# Patient Record
Sex: FEMALE | Race: BLACK OR AFRICAN AMERICAN | NOT HISPANIC OR LATINO | Employment: OTHER | URBAN - METROPOLITAN AREA
[De-identification: names, ages, dates, MRNs, and addresses within clinical notes are randomized per-mention and may not be internally consistent; named-entity substitution may affect disease eponyms.]

---

## 2019-05-27 ENCOUNTER — OFFICE VISIT (OUTPATIENT)
Dept: URGENT CARE | Facility: CLINIC | Age: 74
End: 2019-05-27
Payer: MEDICARE

## 2019-05-27 VITALS
SYSTOLIC BLOOD PRESSURE: 154 MMHG | DIASTOLIC BLOOD PRESSURE: 76 MMHG | RESPIRATION RATE: 20 BRPM | OXYGEN SATURATION: 95 % | TEMPERATURE: 98 F | HEIGHT: 64 IN | HEART RATE: 84 BPM | WEIGHT: 212 LBS | BODY MASS INDEX: 36.19 KG/M2

## 2019-05-27 DIAGNOSIS — S92.901A CLOSED FRACTURE OF RIGHT FOOT, INITIAL ENCOUNTER: Primary | ICD-10-CM

## 2019-05-27 PROCEDURE — 73610 XR ANKLE COMPLETE 3 VIEW RIGHT: ICD-10-PCS | Mod: RT,S$GLB,, | Performed by: RADIOLOGY

## 2019-05-27 PROCEDURE — 73630 XR FOOT COMPLETE 3 VIEW RIGHT: ICD-10-PCS | Mod: RT,S$GLB,, | Performed by: RADIOLOGY

## 2019-05-27 PROCEDURE — 99214 OFFICE O/P EST MOD 30 MIN: CPT | Mod: S$GLB,,, | Performed by: FAMILY MEDICINE

## 2019-05-27 PROCEDURE — 73610 X-RAY EXAM OF ANKLE: CPT | Mod: RT,S$GLB,, | Performed by: RADIOLOGY

## 2019-05-27 PROCEDURE — 3078F DIAST BP <80 MM HG: CPT | Mod: CPTII,S$GLB,, | Performed by: FAMILY MEDICINE

## 2019-05-27 PROCEDURE — 99214 PR OFFICE/OUTPT VISIT, EST, LEVL IV, 30-39 MIN: ICD-10-PCS | Mod: S$GLB,,, | Performed by: FAMILY MEDICINE

## 2019-05-27 PROCEDURE — 3077F SYST BP >= 140 MM HG: CPT | Mod: CPTII,S$GLB,, | Performed by: FAMILY MEDICINE

## 2019-05-27 PROCEDURE — 3077F PR MOST RECENT SYSTOLIC BLOOD PRESSURE >= 140 MM HG: ICD-10-PCS | Mod: CPTII,S$GLB,, | Performed by: FAMILY MEDICINE

## 2019-05-27 PROCEDURE — 3078F PR MOST RECENT DIASTOLIC BLOOD PRESSURE < 80 MM HG: ICD-10-PCS | Mod: CPTII,S$GLB,, | Performed by: FAMILY MEDICINE

## 2019-05-27 PROCEDURE — 73630 X-RAY EXAM OF FOOT: CPT | Mod: RT,S$GLB,, | Performed by: RADIOLOGY

## 2019-05-27 RX ORDER — OMEPRAZOLE 20 MG/1
20 CAPSULE, DELAYED RELEASE ORAL
COMMUNITY
Start: 2019-05-06

## 2019-05-27 RX ORDER — KETOROLAC TROMETHAMINE 5 MG/ML
SOLUTION OPHTHALMIC
Refills: 0 | COMMUNITY
Start: 2019-03-13 | End: 2020-11-03

## 2019-05-27 RX ORDER — OFLOXACIN 3 MG/ML
SOLUTION/ DROPS OPHTHALMIC
Refills: 0 | COMMUNITY
Start: 2019-03-13 | End: 2020-11-03

## 2019-05-27 NOTE — PATIENT INSTRUCTIONS
Foot Fracture  You have a broken bone (fracture) in your foot. This will cause pain, swelling, and often bruising. It will usually take about 4 to 8 weeks to heal. A foot fracture may be treated with a special shoe, splint, cast, or boot.  Home care  Follow these guidelines when caring for yourself at home:  · You may be given a splint, cast, shoe, or boot to keep the injured area from moving. Unless you were told otherwise, use crutches or a walker. Dont put weight on the injured foot until your health care provider says you can do so. (You can rent crutches and a walker at many pharmacies and surgical or orthopedic supply stores.) Dont put weight on a splint, or it will break.  · Keep your leg elevated to reduce pain and swelling. When sleeping, put a pillow under the injured leg. When sitting, support the injured leg so it is above your waist. This is very important during the first 2 days (48 hours).  · Put an ice pack on the injured area. Do this for 20 minutes every 1 to 2 hours the first day for pain relief. You can make an ice pack by wrapping a plastic bag of ice cubes in a thin towel. As the ice melts, be careful that the splint, cast, boot, or shoe doesnt get wet. You can place the ice pack directly over the splint or cast. Unless told otherwise, you can open the boot or shoe to apply the ice pack. Continue using the ice pack 3 to 4 times a day for the next 2 days. Then use the ice pack as needed to ease pain and swelling.  · Keep the splint, cast, boot, or shoe dry. When bathing, protect it with a large plastic bag, rubber-banded at the top end. If a fiberglass splint or cast or boot gets wet, you can dry it with a hair dryer. Unless told otherwise, you can take off the boot or shoe to bathe.  · You may use acetaminophen or ibuprofen to control pain, unless another pain medicine was prescribed. If you have chronic liver or kidney disease, talk with your healthcare provider before using these  medicines. Also talk with your provider if youve had a stomach ulcer or gastrointestinal bleeding.  · Dont put creams or objects under the cast if you have itching.  Follow-up care  Follow up with your healthcare provider, or as advised. This is to make sure the bone is healing the way it should. If you were given a splint, it may be changed to a cast or boot at your follow-up visit.  X-rays may be taken. You will be told of any new findings that may affect your care.  When to seek medical advice  Call your healthcare provider right away if any of these occur:  · The cast or splint cracks  · The plaster cast or splint becomes wet or soft  · The fiberglass cast or splint stays wet for more than 24 hours  · Bad odor from the cast or wound fluid stains the cast  · Tightness or pain under the cast or splint gets worse  · Toes become swollen, cold, blue, numb, or tingly  · You cant move your toes  · Skin around cast or splint becomes red  · Fever of 100.4ºF (38ºC) or higher, or as directed by your healthcare provider  Date Last Reviewed: 2/1/2017  © 5404-7674 [employer]. [address] [place] [address], [place] [address]. All rights reserved. This information is not intended as a substitute for professional medical care. Always follow your healthcare professional's instructions.

## 2019-05-27 NOTE — PROGRESS NOTES
"Subjective:       Patient ID: Beatrice Shankar is a 74 y.o. female.    Vitals:  height is 5' 4" (1.626 m) and weight is 96.2 kg (212 lb). Her tympanic temperature is 98.3 °F (36.8 °C). Her blood pressure is 154/76 (abnormal) and her pulse is 84. Her respiration is 20 and oxygen saturation is 95%.     Chief Complaint: Foot Injury (Right Foot/Ankle)    This is a 74 y.o. female who presents today with a chief complaint of right foot/ankle injury which occurred yesterday. Patient states [place] was [place] when she twisted her ankle.  Her right foot/ankle is swollen and the foot is bruised on top.  She has taken Tylenol with some relief.     Foot Injury    The incident occurred 12 to 24 hours ago. The incident occurred in the street. The injury mechanism was a twisting injury. The pain is present in the right ankle, right foot and right heel. The pain is at a severity of 6/10. The pain is moderate. The pain has been constant since onset. She reports no foreign bodies present. The symptoms are aggravated by weight bearing and movement. Treatments tried: Tylenol. The treatment provided mild relief.       Constitution: Negative for fatigue.   HENT: Negative for facial swelling and facial trauma.    Neck: Negative for neck stiffness.   Cardiovascular: Negative for chest trauma.   Eyes: Negative for eye trauma, double vision and blurred vision.   Gastrointestinal: Negative for abdominal trauma, abdominal pain and rectal bleeding.   Genitourinary: Negative for hematuria, missed menses, genital trauma and pelvic pain.   Musculoskeletal: Positive for pain and trauma. Negative for joint swelling and abnormal ROM of joint.   Skin: Positive for bruising. Negative for color change, wound, abrasion and laceration.   Neurological: Negative for dizziness, history of vertigo, light-headedness, coordination disturbances, altered mental status and loss of consciousness.   Hematologic/Lymphatic: Negative for history " of bleeding disorder.   Psychiatric/Behavioral: Negative for altered mental status.       Objective:      Physical Exam   Constitutional: She appears well-developed and well-nourished.   HENT:   Head: Normocephalic and atraumatic.   Eyes: Pupils are equal, round, and reactive to light. EOM are normal.   Cardiovascular: Normal rate and regular rhythm.   Pulmonary/Chest: Effort normal and breath sounds normal.   Musculoskeletal: She exhibits edema (dorsum of right foot - diffuse swelling noted) and tenderness.   Nursing note and vitals reviewed.      Assessment:       1. Closed fracture of right foot, initial encounter        Plan:         Closed fracture of right foot, initial encounter  -     X-Ray Foot Complete 3 view Right; Future; Expected date: 05/27/2019  -     X-Ray Ankle Complete 3 View Right; Future; Expected date: 05/27/2019  -     NON-PNEUMATIC WALKING BOOT FOR HOME USE  -     CRUTCHES FOR HOME USE  -     Ambulatory referral to Orthopedics

## 2019-08-19 DIAGNOSIS — S92.341A DISPLACED FRACTURE OF FOURTH METATARSAL BONE, RIGHT FOOT, INITIAL ENCOUNTER FOR CLOSED FRACTURE: ICD-10-CM

## 2019-08-19 DIAGNOSIS — S92.331A DISPLACED FRACTURE OF THIRD METATARSAL BONE, RIGHT FOOT, INITIAL ENCOUNTER FOR CLOSED FRACTURE: Primary | ICD-10-CM

## 2019-08-19 DIAGNOSIS — S92.351A DISPLACED FRACTURE OF FIFTH METATARSAL BONE, RIGHT FOOT, INITIAL ENCOUNTER FOR CLOSED FRACTURE: ICD-10-CM

## 2019-08-19 DIAGNOSIS — M25.571 PAIN IN RIGHT ANKLE AND JOINTS OF RIGHT FOOT: ICD-10-CM

## 2019-10-09 ENCOUNTER — CLINICAL SUPPORT (OUTPATIENT)
Dept: REHABILITATION | Facility: HOSPITAL | Age: 74
End: 2019-10-09
Payer: MEDICARE

## 2019-10-09 DIAGNOSIS — R26.89 POOR BALANCE: ICD-10-CM

## 2019-10-09 DIAGNOSIS — R29.898 WEAKNESS OF BOTH LOWER EXTREMITIES: ICD-10-CM

## 2019-10-09 PROCEDURE — G8979 MOBILITY GOAL STATUS: HCPCS | Mod: CJ

## 2019-10-09 PROCEDURE — G8978 MOBILITY CURRENT STATUS: HCPCS | Mod: CJ

## 2019-10-09 PROCEDURE — 97161 PT EVAL LOW COMPLEX 20 MIN: CPT

## 2019-10-09 NOTE — PLAN OF CARE
[facility]  Physical Therapy Initial Evaluation    Name: Beatirce Vernon [facility] Number: [id number]    Therapy Diagnosis:   Encounter Diagnoses   Name Primary?    Poor balance     Weakness of both lower extremities      Physician: Antonio Jensen*    Physician Orders: PT Eval and Treat   Medical Diagnosis from Referral:   S92.331A (ICD-10-CM) - Displaced fracture of third metatarsal bone, right foot, initial encounter for closed fracture  S92.341A (ICD-10-CM) - Displaced fracture of fourth metatarsal bone, right foot, initial encounter for closed fracture  S92.351A (ICD-10-CM) - Displaced fracture of fifth metatarsal bone, right foot, initial encounter for closed fracture  M25.571 (ICD-10-CM) - Pain in right ankle and joints of right foot  Evaluation Date: 10/9/2019  Authorization Period Expiration: 10/19/2019  Plan of Care Expiration: 11/22/2019  Visit # / Visits authorized: 1/ 12    Time In: 915  Time Out: 955  Total Billable Time: 5 minutes    Visit: 113   Total: 113    Precautions: Standard and Fall    Subjective   Date of onset: May 26  History of current condition - Beatrice reports: in May of 2019, pt just received cataract surgery. Pt reports that her vision was not good that day and she turned her head while walking and twisted her ankle. Pt reports that she fractured three of her metatarsal bones in her R foot. Pt reports that she went to the [facility] urgent care the following day to receive xrays. Pt reports she went to [facility] [place] over the last couple of months for follow up visits. Pt reports that her doctor told her that her swelling is getting better and her fractures are healing well. Pt reports that she wears her walking boot whenever she leaves her house, but she tried to wear her tennis shoes when she went to the eye doctor yesterday. Pt reports that she did fine walking in her tennis shoe outside her house. Pt reports that she does not wear her boot  inside her house and she does well getting around. Pt reports she uses her crutch to get in and out of her shower just to be extra cautious she does not fall. Pt reports that she has 3 flights of stairs to get into her apartment and does well navigating them. Pt reports that she would only follow up with her doctor if she needs to.      Medical History:   Past Medical History:   Diagnosis Date    Diabetes mellitus, type 2     Hypertension        Surgical History:   Beatrice Shankar  has a past surgical history that includes Cataract extraction; Appendectomy;  section; Tubal ligation; and Breast biopsy.    Medications:   Beatrice Vernon has a current medication list which includes the following prescription(s): aspirin, atorvastatin, blood sugar diagnostic, esomeprazole, glyburide, irbesartan, ketorolac 0.5%, ofloxacin, omeprazole, paroxetine, and pentoxifylline.    Allergies:   Review of patient's allergies indicates:   Allergen Reactions    Mycinette [cetylpyridinium-benzocaine]     Penicillins     Sulfur         Imaging, Xrays: See imaging report    Prior Therapy: Yes for her back, Pt reports her therapy helped her back  Social History: 3 flights of stairs lives with their family  Occupation: Retired  Prior Level of Function: Independent   Current Level of Function: Independent     Pain:  Current 0/10, worst 0/10, best 0/10   - Pt only complains of soreness, not pain    Location: right ankles and feet   Description: Aching  Aggravating Factors: Walking for prolonged periods  Easing Factors: rest    Pts goals: Get out of her boot and be able to walk 3x a week    Objective     Hip Right Right Left Left Pain/Dysfunction with Movement    AROM MMT AROM MMT    Flexion WNL 4/5 WNL 4/5       Knee Right Right Left Left Pain/Dysfunction with Movement    AROM MMT AROM MMT    Flexion WNL 4/5 WNL 4/5    Extension WNL 5/5 WNL 5/5      Ankle Right Right Left Left Pain/Dysfunction with Movement    AROM MMT AROM MMT     Plantarflexion WNL 4+/5 WNL 4+/5    Dorsiflexion WNL 4+/5 WNL 4+/5    Inversion WNL 4+/5 WNL 4+/5    Eversion WNL 4+/5 WNL 4+/5      Single Leg Balance Test  R 8 seconds  L 30 seconds        CMS Impairment/Limitation/Restriction for FOTO Foot Survey    Therapist reviewed FOTO scores for Beatrice Shankar on 10/9/2019.   FOTO documents entered into [employer] - see Media section.    Limitation Score: 39%  Category: Mobility    Current : CJ = at least 20% but < 40% impaired, limited or restricted  Goal: CJ = at least 20% but < 40% impaired, limited or restricted           TREATMENT   Treatment Time In: 950  Treatment Time Out: 955  Total Treatment time separate from Evaluation: 5 minutes    Beatrice received therapeutic exercises to develop strength for 5 minutes including:    Calf Raises DL 3x10      Home Exercises and Patient Education Provided    Education provided:   - HEP    Written Home Exercises Provided: yes.  Exercises were reviewed and Beatrice was able to demonstrate them prior to the end of the session.  Beatrice demonstrated good  understanding of the education provided.     See EMR under Patient Instructions for exercises provided 10/9/2019.    Assessment   Beatrice Vernon is a 74 y.o. female referred to outpatient Physical Therapy with a medical diagnosis of fracture of third, fourth, and fifth metatarsal heads on R foot. Pt presents with decreased balance and proprioception on R LE. Pt demo normal LE strength and range of motion. Pt was educated on weaning from boot as tolerated. Pt would benefit from physical therapy to improve proprioception and balance of R LE. Pts condition is not limiting pt from ADLs or functional activities.     Pt prognosis is Excellent.   Pt will benefit from skilled outpatient Physical Therapy to address the deficits stated above and in the chart below, provide pt/family education, and to maximize pt's level of independence.     Plan of care discussed with patient: Yes  Pt's spiritual,  cultural and educational needs considered and patient is agreeable to the plan of care and goals as stated below:     Anticipated Barriers for therapy: None    Medical Necessity is demonstrated by the following  History  Co-morbidities and personal factors that may impact the plan of care Co-morbidities:   HTN    Personal Factors:   no deficits     moderate   Examination  Body Structures and Functions, activity limitations and participation restrictions that may impact the plan of care Body Regions:   Foot    Body Systems:    Proprioception     Participation Restrictions:   None    Activity limitations:   Learning and applying knowledge  no deficits    General Tasks and Commands  no deficits    Communication  no deficits    Mobility  no deficits    Self care  no deficits    Domestic Life  no deficits    Interactions/Relationships  no deficits    Life Areas  no deficits    Community and Social Life  no deficits         low   Clinical Presentation stable and uncomplicated low   Decision Making/ Complexity Score: low     Goals:    Long Term Goals (6 Weeks):   1. Pt will improve FOTO score to </= 20% limited to decrease perceived limitation with maintaining/changing body position.   2. Pt will perform SLS on R LE for 30 seconds without loss of balance  3. Pt will score a normal score on BHAT Balance testing  4. Pt will be independent with HEP for improvement of functional mobility.       Plan   Plan of care Certification: 10/9/2019 to 11/22/2019.    Outpatient Physical Therapy 1 times weekly for 6 weeks to include the following interventions: Manual Therapy, Moist Heat/ Ice, Neuromuscular Re-ed, Patient Education, Self Care, Therapeutic Activites and Therapeutic Exercise.     Mukund Harmon, PT

## 2019-10-16 ENCOUNTER — CLINICAL SUPPORT (OUTPATIENT)
Dept: REHABILITATION | Facility: HOSPITAL | Age: 74
End: 2019-10-16
Payer: MEDICARE

## 2019-10-16 DIAGNOSIS — R26.89 POOR BALANCE: ICD-10-CM

## 2019-10-16 DIAGNOSIS — R29.898 WEAKNESS OF BOTH LOWER EXTREMITIES: ICD-10-CM

## 2019-10-16 PROCEDURE — 97110 THERAPEUTIC EXERCISES: CPT

## 2019-10-16 PROCEDURE — 97112 NEUROMUSCULAR REEDUCATION: CPT

## 2019-10-16 NOTE — PROGRESS NOTES
Physical Therapy Daily Treatment Note     Name: Beatrice Vernon [facility] Number: [id number]    Therapy Diagnosis:   Encounter Diagnoses   Name Primary?    Poor balance     Weakness of both lower extremities      Physician: Antonio Jensen*    Visit Date: 10/16/2019    Physician Orders: PT Eval and Treat   Medical Diagnosis from Referral:   S92.331A (ICD-10-CM) - Displaced fracture of third metatarsal bone, right foot, initial encounter for closed fracture  S92.341A (ICD-10-CM) - Displaced fracture of fourth metatarsal bone, right foot, initial encounter for closed fracture  S92.351A (ICD-10-CM) - Displaced fracture of fifth metatarsal bone, right foot, initial encounter for closed fracture  M25.571 (ICD-10-CM) - Pain in right ankle and joints of right foot  Evaluation Date: 10/9/2019  Authorization Period Expiration: 10/19/2019  Plan of Care Expiration: 11/22/2019  Visit # / Visits authorized: 2/ 12     Time In: 205  Time Out: 300  Total Billable Time:  55 minutes     Visit: 121  Total: 234     Precautions: Standard and Fall       Subjective     Pt reports: doing great outside of her boot when walking. Pt reports she does not have pain when walking without her boot, but she occasionally feels sore. Pt reports that she is doing great walking up and down the stairs. Pt reports that the numbness and tingling in her toes is improving with the exercises she received.   She was compliant with home exercise program.  Response to previous treatment: No adverse effects  Functional change: None    Pain: 0/10  Location: right feet      Objective     Beatrice received therapeutic exercises to develop strength, endurance, ROM, flexibility, posture and core stabilization for 45 minutes including:    Bike 5 min    Gastroc Wedge Stretch 30 sec x 3  Soleus Wedge Stretch 30 sec x 3  Bridges 3x10  SLR 3x10  Clams 3x10  Toe scrunches 2 min x 2      Beatrice participated in neuromuscular re-education activities to improve:  Balance, Coordination and Proprioception for 10 minutes. The following activities were included:    Romberg Eyes Closed on Blue Foam 30 sec x 3  SLS with stool assist 30 sec x 3        Home Exercises Provided and Patient Education Provided     Education provided:   - HEP    Written Home Exercises Provided: yes.  Exercises were reviewed and Beatrice was able to demonstrate them prior to the end of the session.  Beatrice demonstrated good  understanding of the education provided.     See EMR under Patient Instructions for exercises provided 10/16/2019.    Assessment     Pt tolerated all LE strengthening exercises, stretching, and balance exercises without increased symptoms. Pt with 4-6 loss of balance with single leg stance on R leg secondary to decreased proprioception. Pt reported no adverse effects after leaving therapy session.   Beatrice is progressing well towards her goals.   Pt prognosis is Good.     Pt will continue to benefit from skilled outpatient physical therapy to address the deficits listed in the problem list box on initial evaluation, provide pt/family education and to maximize pt's level of independence in the home and community environment.     Pt's spiritual, cultural and educational needs considered and pt agreeable to plan of care and goals.     Anticipated barriers to physical therapy: None    Goals:   Long Term Goals (6 Weeks):   1. Pt will improve FOTO score to </= 20% limited to decrease perceived limitation with maintaining/changing body position.   2. Pt will perform SLS on R LE for 30 seconds without loss of balance  3. Pt will score a normal score on BHAT Balance testing  4. Pt will be independent with HEP for improvement of functional mobility.     Plan        Outpatient Physical Therapy 1 times weekly for 6 weeks to include the following interventions: Manual Therapy, Moist Heat/ Ice, Neuromuscular Re-ed, Patient Education, Self Care, Therapeutic Activites and Therapeutic Exercise.         Mukund Harmon, PT

## 2019-10-16 NOTE — PROGRESS NOTES
"  Physical Therapy Daily Treatment Note     Name: Beatrice Vernon [facility]  Clinic Number: [id number]    Therapy Diagnosis: No diagnosis found.  Physician: Antonio Jensen*    Visit Date: 10/16/2019    Physician Orders: PT Eval and Treat   Medical Diagnosis from Referral:   S92.331A (ICD-10-CM) - Displaced fracture of third metatarsal bone, right foot, initial encounter for closed fracture  S92.341A (ICD-10-CM) - Displaced fracture of fourth metatarsal bone, right foot, initial encounter for closed fracture  S92.351A (ICD-10-CM) - Displaced fracture of fifth metatarsal bone, right foot, initial encounter for closed fracture  M25.571 (ICD-10-CM) - Pain in right ankle and joints of right foot  Evaluation Date: 10/9/2019  Authorization Period Expiration: 10/19/2019  Plan of Care Expiration: 11/22/2019  Visit # / Visits authorized: 2/ 12     Time In: ***  Time Out: ***  Total Billable Time: *** minutes     Visit: ***  Total: 113     Precautions: Standard and Fall    Subjective     Pt reports: ***.  She {Actions; was/was not:02946} compliant with home exercise program.  Response to previous treatment: ***  Functional change: ***    Pain: {0-10:36238::"0"}/10  Location: {RIGHT/LEFT/BILATERAL:29030} {LOCATION ON BODY:77730}     Objective     Beatrice received therapeutic exercises to develop strength for *** minutes including:     Calf Raises DL 3x10        Home Exercises and Patient Education Provided     Education provided:   - HEP     Written Home Exercises Provided: yes.  Exercises were reviewed and Beatrice was able to demonstrate them prior to the end of the session.  Beatrice demonstrated good  understanding of the education provided.      See EMR under Patient Instructions for exercises provided 10/9/2019.    Assessment     ***  Beatrice {IS/IS NOT:47886} progressing well towards her goals.   Pt prognosis is {REHAB PROGNOSIS OHS:77606}.     Pt will continue to benefit from skilled outpatient physical therapy to address the " deficits listed in the problem list box on initial evaluation, provide pt/family education and to maximize pt's level of independence in the home and community environment.     Pt's spiritual, cultural and educational needs considered and pt agreeable to plan of care and goals.     Anticipated barriers to physical therapy: ***    Goals:     Long Term Goals (6 Weeks):   1. Pt will improve FOTO score to </= 20% limited to decrease perceived limitation with maintaining/changing body position.   2. Pt will perform SLS on R LE for 30 seconds without loss of balance  3. Pt will score a normal score on BHAT Balance testing  4. Pt will be independent with HEP for improvement of functional mobility.     Plan     ***    Nelda Hamilton, PT

## 2019-11-01 ENCOUNTER — CLINICAL SUPPORT (OUTPATIENT)
Dept: REHABILITATION | Facility: HOSPITAL | Age: 74
End: 2019-11-01
Payer: MEDICARE

## 2019-11-01 DIAGNOSIS — R29.898 WEAKNESS OF BOTH LOWER EXTREMITIES: ICD-10-CM

## 2019-11-01 DIAGNOSIS — R26.89 POOR BALANCE: ICD-10-CM

## 2019-11-01 PROCEDURE — 97110 THERAPEUTIC EXERCISES: CPT

## 2019-11-01 NOTE — PROGRESS NOTES
Physical Therapy Daily Treatment Note     Name: Beatrice Vernon [facility]  Clinic Number: [id number]    Therapy Diagnosis:   Encounter Diagnoses   Name Primary?    Poor balance     Weakness of both lower extremities      Physician: Antonio Jensen*    Visit Date: 11/1/2019    Physician Orders: PT Eval and Treat   Medical Diagnosis from Referral:   S92.331A (ICD-10-CM) - Displaced fracture of third metatarsal bone, right foot, initial encounter for closed fracture  S92.341A (ICD-10-CM) - Displaced fracture of fourth metatarsal bone, right foot, initial encounter for closed fracture  S92.351A (ICD-10-CM) - Displaced fracture of fifth metatarsal bone, right foot, initial encounter for closed fracture  M25.571 (ICD-10-CM) - Pain in right ankle and joints of right foot  Evaluation Date: 10/9/2019  Authorization Period Expiration: 10/19/2019  Plan of Care Expiration: 11/22/2019  Visit # / Visits authorized: 3/ 12     Time In: 11:00  Time Out: 11:50  Total Billable Time:  25 minutes     Visit: 60.64  Total: 295     Precautions: Standard and Fall       Subjective     Pt reports: she is no longer wearing the boot and is not having much trouble or pain with walking.   She was compliant with home exercise program.  Response to previous treatment: No adverse effects  Functional change: None    Pain: 0/10  Location: right feet      Objective     Beatrice received therapeutic exercises to develop strength, endurance, ROM, flexibility, posture and core stabilization for 40 minutes including:    Bike 5 min    Gastroc Wedge Stretch 30 sec x 3  Soleus Wedge Stretch 30 sec x 3  Bridges 3x10  SLR 3x10  Clams 3x10  Toe scrunches 2 min x 2  Ankle 4-way OTB 2x10      Beatrice participated in neuromuscular re-education activities to improve: Balance, Coordination and Proprioception for 10 minutes. The following activities were included:    Romberg Eyes Closed on Blue Foam 30 sec x 3  SLS with stool assist 30 sec x 3        Home Exercises  Provided and Patient Education Provided     Education provided:   - HEP    Written Home Exercises Provided: yes.  Exercises were reviewed and Beatrice was able to demonstrate them prior to the end of the session.  Beatrice demonstrated good  understanding of the education provided.     See EMR under Patient Instructions for exercises provided 10/16/2019.    Assessment     Pt tolerated all LE strengthening exercises, stretching, and balance exercises without increased symptoms. She was able to tolerated new therex with minimal cueing for correct form. Pt required CGA/SBA for balance exercises today.  Pt reported no adverse effects after leaving therapy session.   Beatrice is progressing well towards her goals.   Pt prognosis is Good.     Pt will continue to benefit from skilled outpatient physical therapy to address the deficits listed in the problem list box on initial evaluation, provide pt/family education and to maximize pt's level of independence in the home and community environment.     Pt's spiritual, cultural and educational needs considered and pt agreeable to plan of care and goals.     Anticipated barriers to physical therapy: None    Goals:   Long Term Goals (6 Weeks):   1. Pt will improve FOTO score to </= 20% limited to decrease perceived limitation with maintaining/changing body position.   2. Pt will perform SLS on R LE for 30 seconds without loss of balance  3. Pt will score a normal score on BHAT Balance testing  4. Pt will be independent with HEP for improvement of functional mobility.     Plan        Outpatient Physical Therapy 1 times weekly for 6 weeks to include the following interventions: Manual Therapy, Moist Heat/ Ice, Neuromuscular Re-ed, Patient Education, Self Care, Therapeutic Activites and Therapeutic Exercise.        Nelda Hamilton, PT

## 2019-11-11 ENCOUNTER — CLINICAL SUPPORT (OUTPATIENT)
Dept: REHABILITATION | Facility: HOSPITAL | Age: 74
End: 2019-11-11
Payer: MEDICARE

## 2019-11-11 DIAGNOSIS — R26.89 POOR BALANCE: ICD-10-CM

## 2019-11-11 DIAGNOSIS — R29.898 WEAKNESS OF BOTH LOWER EXTREMITIES: ICD-10-CM

## 2019-11-11 PROCEDURE — 97110 THERAPEUTIC EXERCISES: CPT

## 2019-11-11 NOTE — PROGRESS NOTES
Physical Therapy Daily Treatment Note     Name: Beatrice Vernon [facility]  Clinic Number: [id number]    Therapy Diagnosis:   Encounter Diagnoses   Name Primary?    Poor balance     Weakness of both lower extremities      Physician: Antonio Jensen*    Visit Date: 11/11/2019    Physician Orders: PT Eval and Treat   Medical Diagnosis from Referral:   S92.331A (ICD-10-CM) - Displaced fracture of third metatarsal bone, right foot, initial encounter for closed fracture  S92.341A (ICD-10-CM) - Displaced fracture of fourth metatarsal bone, right foot, initial encounter for closed fracture  S92.351A (ICD-10-CM) - Displaced fracture of fifth metatarsal bone, right foot, initial encounter for closed fracture  M25.571 (ICD-10-CM) - Pain in right ankle and joints of right foot  Evaluation Date: 10/9/2019  Authorization Period Expiration: 10/19/2019  Plan of Care Expiration: 11/22/2019  Visit # / Visits authorized: 4/ 12     Time In: 1:14 pm  Time Out: 2:00 pm  Total Billable Time:  23 minutes     Visit: 60.64  Total: 355     Precautions: Standard and Fall       Subjective     Pt reports: her foot is a little sore today; she was on it a lot this weekend   She was compliant with home exercise program.  Response to previous treatment: No adverse effects  Functional change: None    Pain: 0/10  Location: right feet      Objective     Beatrice received therapeutic exercises to develop strength, endurance, ROM, flexibility, posture and core stabilization for 36 minutes including:    Bike 5 min    Gastroc Wedge Stretch 30 sec x 3  Soleus Wedge Stretch 30 sec x 3  Bridges 3x10  SLR 3x10  Clams 3x10  Toe scrunches 2 min x 2  Toe yoga 20x each  Ankle 4-way OTB 2x10      Beatrice participated in neuromuscular re-education activities to improve: Balance, Coordination and Proprioception for 10 minutes. The following activities were included:    Romberg Eyes Closed on Blue Foam 30 sec x 3  SLS with 1 finger 30 sec x 3        Home Exercises  Provided and Patient Education Provided     Education provided:   - HEP    Written Home Exercises Provided: yes.  Exercises were reviewed and Beatrice was able to demonstrate them prior to the end of the session.  Beatrice demonstrated good  understanding of the education provided.     See EMR under Patient Instructions for exercises provided 10/16/2019.    Assessment     Pt tolerated all LE strengthening exercises, stretching, and balance exercises without increased symptoms. She was able to tolerated new toe yoga exercise with minimal cueing for correct form. Pt required SBA for balance exercises today.  Pt reported no adverse effects after leaving therapy session.   Beatrice is progressing well towards her goals.   Pt prognosis is Good.     Pt will continue to benefit from skilled outpatient physical therapy to address the deficits listed in the problem list box on initial evaluation, provide pt/family education and to maximize pt's level of independence in the home and community environment.     Pt's spiritual, cultural and educational needs considered and pt agreeable to plan of care and goals.     Anticipated barriers to physical therapy: None    Goals:   Long Term Goals (6 Weeks):   1. Pt will improve FOTO score to </= 20% limited to decrease perceived limitation with maintaining/changing body position.   2. Pt will perform SLS on R LE for 30 seconds without loss of balance  3. Pt will score a normal score on BHAT Balance testing  4. Pt will be independent with HEP for improvement of functional mobility.     Plan        Outpatient Physical Therapy 1 times weekly for 6 weeks to include the following interventions: Manual Therapy, Moist Heat/ Ice, Neuromuscular Re-ed, Patient Education, Self Care, Therapeutic Activites and Therapeutic Exercise.        Nelda Hamilton, PT

## 2019-11-18 ENCOUNTER — CLINICAL SUPPORT (OUTPATIENT)
Dept: REHABILITATION | Facility: HOSPITAL | Age: 74
End: 2019-11-18
Payer: MEDICARE

## 2019-11-18 DIAGNOSIS — R26.89 POOR BALANCE: ICD-10-CM

## 2019-11-18 DIAGNOSIS — R29.898 WEAKNESS OF BOTH LOWER EXTREMITIES: ICD-10-CM

## 2019-11-18 PROCEDURE — 97112 NEUROMUSCULAR REEDUCATION: CPT

## 2019-11-18 PROCEDURE — 97110 THERAPEUTIC EXERCISES: CPT

## 2019-11-18 NOTE — PROGRESS NOTES
Physical Therapy Daily Treatment Note     Name: Beatrice Vernon [facility]  Clinic Number: [id number]    Therapy Diagnosis:   Encounter Diagnoses   Name Primary?    Poor balance     Weakness of both lower extremities      Physician: Antnoio Jensen*    Visit Date: 11/18/2019    Physician Orders: PT Eval and Treat   Medical Diagnosis from Referral:   S92.331A (ICD-10-CM) - Displaced fracture of third metatarsal bone, right foot, initial encounter for closed fracture  S92.341A (ICD-10-CM) - Displaced fracture of fourth metatarsal bone, right foot, initial encounter for closed fracture  S92.351A (ICD-10-CM) - Displaced fracture of fifth metatarsal bone, right foot, initial encounter for closed fracture  M25.571 (ICD-10-CM) - Pain in right ankle and joints of right foot  Evaluation Date: 10/9/2019  Authorization Period Expiration: 10/19/2019  Plan of Care Expiration: 11/22/2019  Visit # / Visits authorized: 5/ 12     Time In: 2:00 pm  Time Out: 2:45 pm  Total Billable Time: 45 minutes     Visit: 95.11  Total: 450.11     Precautions: Standard and Fall       Subjective     Pt reports: her foot is a little sore today; she was on it a lot this weekend   She was compliant with home exercise program.  Response to previous treatment: No adverse effects  Functional change: None    Pain: 0/10  Location: right feet      Objective     Beatrice received therapeutic exercises to develop strength, endurance, ROM, flexibility, posture and core stabilization for 35 minutes including:    Bike 5 min    Gastroc Wedge Stretch 30 sec x 3  Soleus Wedge Stretch 30 sec x 3  Bridges 3x10- held today; increased LBP  SLR 3x10  Clams 3x10  Toe scrunches 2 min x 2  Toe yoga 20x each  Ankle 4-way OTB 2x10      Beatrice participated in neuromuscular re-education activities to improve: Balance, Coordination and Proprioception for 10 minutes. The following activities were included:    Romberg Eyes Closed on Blue Foam 30 sec x 3  SLS with 1 finger 30 sec  x 3  Cone taps 2x10        Home Exercises Provided and Patient Education Provided     Education provided:   - HEP    Written Home Exercises Provided: yes.  Exercises were reviewed and Beatrice was able to demonstrate them prior to the end of the session.  Beatrice demonstrated good  understanding of the education provided.     See EMR under Patient Instructions for exercises provided 10/16/2019.    Assessment     Pt tolerated all LE strengthening exercises, stretching, and balance exercises without increased symptoms. Pt required SBA for balance exercises today and tolerated new balance exercise well.  Pt reported no adverse effects after leaving therapy session.   Beatrice is progressing well towards her goals.   Pt prognosis is Good.     Pt will continue to benefit from skilled outpatient physical therapy to address the deficits listed in the problem list box on initial evaluation, provide pt/family education and to maximize pt's level of independence in the home and community environment.     Pt's spiritual, cultural and educational needs considered and pt agreeable to plan of care and goals.     Anticipated barriers to physical therapy: None    Goals:   Long Term Goals (6 Weeks):   1. Pt will improve FOTO score to </= 20% limited to decrease perceived limitation with maintaining/changing body position.   2. Pt will perform SLS on R LE for 30 seconds without loss of balance  3. Pt will score a normal score on BHAT Balance testing  4. Pt will be independent with HEP for improvement of functional mobility.     Plan        Outpatient Physical Therapy 1 times weekly for 6 weeks to include the following interventions: Manual Therapy, Moist Heat/ Ice, Neuromuscular Re-ed, Patient Education, Self Care, Therapeutic Activites and Therapeutic Exercise.        Nelda Hamilton, PT

## 2019-12-03 ENCOUNTER — DOCUMENTATION ONLY (OUTPATIENT)
Dept: REHABILITATION | Facility: HOSPITAL | Age: 74
End: 2019-12-03

## 2019-12-03 PROBLEM — R29.898 LEG WEAKNESS: Status: RESOLVED | Noted: 2019-10-09 | Resolved: 2019-12-03

## 2019-12-03 PROBLEM — R26.89 POOR BALANCE: Status: RESOLVED | Noted: 2019-10-09 | Resolved: 2019-12-03

## 2019-12-03 NOTE — PROGRESS NOTES
Outpatient Therapy Discharge Summary     Name: Beatrice Vernon [facility] Number: [id number]    Therapy Diagnosis:        Encounter Diagnoses   Name Primary?    Poor balance      Weakness of both lower extremities      Physician: Antonio Jensen*  Physician Orders: PT Eval and Treat   Medical Diagnosis from Referral:   S92.331A (ICD-10-CM) - Displaced fracture of third metatarsal bone, right foot, initial encounter for closed fracture  S92.341A (ICD-10-CM) - Displaced fracture of fourth metatarsal bone, right foot, initial encounter for closed fracture  S92.351A (ICD-10-CM) - Displaced fracture of fifth metatarsal bone, right foot, initial encounter for closed fracture  M25.571 (ICD-10-CM) - Pain in right ankle and joints of right foot  Evaluation Date: 10/9/2019      Date of Last visit: 11/18/19      Assessment        Discharge reason: Patient requested discharge    Plan   This patient is discharged from Physical Therapy

## 2020-01-15 ENCOUNTER — OFFICE VISIT (OUTPATIENT)
Dept: URGENT CARE | Facility: CLINIC | Age: 75
End: 2020-01-15
Payer: MEDICARE

## 2020-01-15 VITALS
RESPIRATION RATE: 20 BRPM | BODY MASS INDEX: 35.85 KG/M2 | TEMPERATURE: 98 F | SYSTOLIC BLOOD PRESSURE: 158 MMHG | HEIGHT: 64 IN | WEIGHT: 210 LBS | DIASTOLIC BLOOD PRESSURE: 80 MMHG | OXYGEN SATURATION: 97 % | HEART RATE: 85 BPM

## 2020-01-15 DIAGNOSIS — J01.00 ACUTE MAXILLARY SINUSITIS, RECURRENCE NOT SPECIFIED: Primary | ICD-10-CM

## 2020-01-15 PROCEDURE — 99214 OFFICE O/P EST MOD 30 MIN: CPT | Mod: S$GLB,,, | Performed by: FAMILY MEDICINE

## 2020-01-15 PROCEDURE — 99214 PR OFFICE/OUTPT VISIT, EST, LEVL IV, 30-39 MIN: ICD-10-PCS | Mod: S$GLB,,, | Performed by: FAMILY MEDICINE

## 2020-01-15 RX ORDER — PAROXETINE 10 MG/1
TABLET, FILM COATED ORAL
COMMUNITY
Start: 2019-11-10

## 2020-01-15 RX ORDER — LEVOFLOXACIN 500 MG/1
500 TABLET, FILM COATED ORAL DAILY
Qty: 10 TABLET | Refills: 0 | Status: SHIPPED | OUTPATIENT
Start: 2020-01-15 | End: 2020-01-25

## 2020-01-15 RX ORDER — ATORVASTATIN CALCIUM 20 MG/1
20 TABLET, FILM COATED ORAL
COMMUNITY
Start: 2019-06-26

## 2020-01-15 RX ORDER — OLMESARTAN MEDOXOMIL 20 MG/1
TABLET ORAL
COMMUNITY
Start: 2019-11-06 | End: 2022-05-26

## 2020-01-15 NOTE — PATIENT INSTRUCTIONS
Sinusitis (Antibiotic Treatment)    The sinuses are air-filled spaces within the bones of the face. They connect to the inside of the nose. Sinusitis is an inflammation of the tissue lining the sinus cavity. Sinus inflammation can occur during a cold. It can also be due to allergies to pollens and other particles in the air. Sinusitis can cause symptoms of sinus congestion and fullness. A sinus infection causes fever, headache and facial pain. There is often green or yellow drainage from the nose or into the back of the throat (post-nasal drip). You have been given antibiotics to treat this condition.  Home care:  · Take the full course of antibiotics as instructed. Do not stop taking them, even if you feel better.  · Drink plenty of water, hot tea, and other liquids. This may help thin mucus. It also may promote sinus drainage.  · Heat may help soothe painful areas of the face. Use a towel soaked in hot water. Or,  the shower and direct the hot spray onto your face. Using a vaporizer along with a menthol rub at night may also help.   · An expectorant containing guaifenesin may help thin the mucus and promote drainage from the sinuses.  · Over-the-counter decongestants may be used unless a similar medicine was prescribed. Nasal sprays work the fastest. Use one that contains phenylephrine or oxymetazoline. First blow the nose gently. Then use the spray. Do not use these medicines more often than directed on the label or symptoms may get worse. You may also use tablets containing pseudoephedrine. Avoid products that combine ingredients, because side effects may be increased. Read labels. You can also ask the pharmacist for help. (NOTE: Persons with high blood pressure should not use decongestants. They can raise blood pressure.)  · Over-the-counter antihistamines may help if allergies contributed to your sinusitis.    · Do not use nasal rinses or irrigation during an acute sinus infection, unless told to by  your health care provider. Rinsing may spread the infection to other sinuses.  · Use acetaminophen or ibuprofen to control pain, unless another pain medicine was prescribed. (If you have chronic liver or kidney disease or ever had a stomach ulcer, talk with your doctor before using these medicines. Aspirin should never be used in anyone under 18 years of age who is ill with a fever. It may cause severe liver damage.)  · Don't smoke. This can worsen symptoms.  Follow-up care  Follow up with your healthcare provider or our staff if you are not improving within the next week.  When to seek medical advice  Call your healthcare provider if any of these occur:  · Facial pain or headache becoming more severe  · Stiff neck  · Unusual drowsiness or confusion  · Swelling of the forehead or eyelids  · Vision problems, including blurred or double vision  · Fever of 100.4ºF (38ºC) or higher, or as directed by your healthcare provider  · Seizure  · Breathing problems  · Symptoms not resolving within 10 days  Date Last Reviewed: 4/13/2015  © 4133-5029 [employer]. [address] [place] [address], [place] [address]. All rights reserved. This information is not intended as a substitute for professional medical care. Always follow your healthcare professional's instructions.

## 2020-01-15 NOTE — PROGRESS NOTES
"Subjective:       Patient ID: Beatrice Shankar is a 74 y.o. female.    Vitals:  height is 5' 4" (1.626 m) and weight is 95.3 kg (210 lb). Her oral temperature is 97.7 °F (36.5 °C). Her blood pressure is 158/80 (abnormal) and her pulse is 85. Her respiration is 20 and oxygen saturation is 97%.     Chief Complaint: Sinus Problem and Otalgia    This is a 74 y.o. female   with Past Medical History:  No date: Diabetes mellitus, type 2  No date: Hypertension   and Past Surgical History:  No date: APPENDECTOMY  No date: BREAST BIOPSY  No date: CATARACT EXTRACTION  No date:  SECTION  No date: TUBAL LIGATION  who presents today with a chief complaint of a sinus problem that began a week ago. She's complaining of sinus pain, sinus pressure and dizzyness. She's also complaining of right ear pain that began a month ago. She's been using flonase to help relieve her symptoms.     Sinus Problem   This is a new problem. The current episode started 1 to 4 weeks ago. The problem has been gradually worsening since onset. There has been no fever. Her pain is at a severity of 0/10. She is experiencing no pain. Associated symptoms include ear pain, sinus pressure and a sore throat. Pertinent negatives include no chills, congestion, coughing, diaphoresis or shortness of breath. Treatments tried: flonase. The treatment provided no relief.   Otalgia    There is pain in the right ear. This is a new problem. The current episode started 1 to 4 weeks ago. The problem occurs constantly. The problem has been waxing and waning. There has been no fever. The pain is at a severity of 6/10. The pain is moderate. Associated symptoms include a sore throat. Pertinent negatives include no coughing, rash or vomiting. She has tried nothing for the symptoms.       Constitution: Negative for chills, sweating, fatigue and fever.   HENT: Positive for ear pain, sinus pain, sinus pressure and sore throat. Negative for congestion and voice change.  "   Neck: Negative for painful lymph nodes.   Eyes: Negative for eye redness.   Respiratory: Negative for chest tightness, cough, sputum production, bloody sputum, COPD, shortness of breath, stridor, wheezing and asthma.    Gastrointestinal: Negative for nausea and vomiting.   Musculoskeletal: Negative for muscle ache.   Skin: Negative for rash.   Allergic/Immunologic: Negative for seasonal allergies and asthma.   Neurological: Positive for dizziness.   Hematologic/Lymphatic: Negative for swollen lymph nodes.       Objective:      Physical Exam   Constitutional: She appears well-developed and well-nourished.   HENT:   Head: Normocephalic and atraumatic.   Right Ear: Tympanic membrane is injected and bulging.   Nose: Mucosal edema and rhinorrhea present. Right sinus exhibits maxillary sinus tenderness.   Eyes: Pupils are equal, round, and reactive to light. EOM are normal.   Neck: Normal range of motion. Neck supple.   Cardiovascular: Normal rate, regular rhythm and normal heart sounds.   Pulmonary/Chest: Effort normal and breath sounds normal.   Abdominal: Soft.   Nursing note and vitals reviewed.        Assessment:       1. Acute maxillary sinusitis, recurrence not specified        Plan:         Acute maxillary sinusitis, recurrence not specified  -     levoFLOXacin (LEVAQUIN) 500 MG tablet; Take 1 tablet (500 mg total) by mouth once daily. for 10 days  Dispense: 10 tablet; Refill: 0    recommended continue with Flonase and saline nasal rinses

## 2020-09-24 ENCOUNTER — HOSPITAL ENCOUNTER (EMERGENCY)
Facility: HOSPITAL | Age: 75
Discharge: HOME | End: 2020-09-24
Attending: EMERGENCY MEDICINE
Payer: MEDICARE

## 2020-09-24 VITALS
RESPIRATION RATE: 17 BRPM | BODY MASS INDEX: 33.46 KG/M2 | WEIGHT: 196 LBS | OXYGEN SATURATION: 97 % | TEMPERATURE: 98 F | DIASTOLIC BLOOD PRESSURE: 78 MMHG | HEIGHT: 64 IN | HEART RATE: 82 BPM | SYSTOLIC BLOOD PRESSURE: 172 MMHG

## 2020-09-24 DIAGNOSIS — U07.1 COVID-19 VIRUS INFECTION: Primary | ICD-10-CM

## 2020-09-24 DIAGNOSIS — R53.83 FATIGUE: ICD-10-CM

## 2020-09-24 LAB
ALBUMIN SERPL BCP-MCNC: 3.5 G/DL (ref 3.5–5.2)
ALP SERPL-CCNC: 62 U/L (ref 55–135)
ALT SERPL W/O P-5'-P-CCNC: 13 U/L (ref 10–44)
ANION GAP SERPL CALC-SCNC: 8 MMOL/L (ref 8–16)
AST SERPL-CCNC: 17 U/L (ref 10–40)
BASOPHILS # BLD AUTO: 0.03 K/UL (ref 0–0.2)
BASOPHILS NFR BLD: 0.6 % (ref 0–1.9)
BILIRUB SERPL-MCNC: 0.4 MG/DL (ref 0.1–1)
BILIRUB UR QL STRIP: NEGATIVE
BNP SERPL-MCNC: 41 PG/ML (ref 0–99)
BUN SERPL-MCNC: 5 MG/DL (ref 8–23)
CALCIUM SERPL-MCNC: 9 MG/DL (ref 8.7–10.5)
CHLORIDE SERPL-SCNC: 104 MMOL/L (ref 95–110)
CK SERPL-CCNC: 46 U/L (ref 20–180)
CLARITY UR REFRACT.AUTO: CLEAR
CO2 SERPL-SCNC: 27 MMOL/L (ref 23–29)
COLOR UR AUTO: ABNORMAL
CREAT SERPL-MCNC: 0.7 MG/DL (ref 0.5–1.4)
CRP SERPL-MCNC: 19.2 MG/L (ref 0–8.2)
DIFFERENTIAL METHOD: ABNORMAL
EOSINOPHIL # BLD AUTO: 0 K/UL (ref 0–0.5)
EOSINOPHIL NFR BLD: 0.8 % (ref 0–8)
ERYTHROCYTE [DISTWIDTH] IN BLOOD BY AUTOMATED COUNT: 15.1 % (ref 11.5–14.5)
EST. GFR  (AFRICAN AMERICAN): >60 ML/MIN/1.73 M^2
EST. GFR  (NON AFRICAN AMERICAN): >60 ML/MIN/1.73 M^2
FERRITIN SERPL-MCNC: 118 NG/ML (ref 20–300)
GLUCOSE SERPL-MCNC: 162 MG/DL (ref 70–110)
GLUCOSE UR QL STRIP: NEGATIVE
HCT VFR BLD AUTO: 36.4 % (ref 37–48.5)
HGB BLD-MCNC: 12.4 G/DL (ref 12–16)
HGB UR QL STRIP: NEGATIVE
IMM GRANULOCYTES # BLD AUTO: 0.02 K/UL (ref 0–0.04)
IMM GRANULOCYTES NFR BLD AUTO: 0.4 % (ref 0–0.5)
KETONES UR QL STRIP: NEGATIVE
LDH SERPL L TO P-CCNC: 364 U/L (ref 110–260)
LEUKOCYTE ESTERASE UR QL STRIP: NEGATIVE
LYMPHOCYTES # BLD AUTO: 1.5 K/UL (ref 1–4.8)
LYMPHOCYTES NFR BLD: 27.9 % (ref 18–48)
MCH RBC QN AUTO: 29.5 PG (ref 27–31)
MCHC RBC AUTO-ENTMCNC: 34.1 G/DL (ref 32–36)
MCV RBC AUTO: 87 FL (ref 82–98)
MONOCYTES # BLD AUTO: 0.3 K/UL (ref 0.3–1)
MONOCYTES NFR BLD: 5 % (ref 4–15)
NEUTROPHILS # BLD AUTO: 3.4 K/UL (ref 1.8–7.7)
NEUTROPHILS NFR BLD: 65.3 % (ref 38–73)
NITRITE UR QL STRIP: NEGATIVE
NRBC BLD-RTO: 0 /100 WBC
PH UR STRIP: 7 [PH] (ref 5–8)
PLATELET # BLD AUTO: 206 K/UL (ref 150–350)
PLATELET BLD QL SMEAR: ABNORMAL
PMV BLD AUTO: 12 FL (ref 9.2–12.9)
POTASSIUM SERPL-SCNC: 3.7 MMOL/L (ref 3.5–5.1)
PROT SERPL-MCNC: 7.1 G/DL (ref 6–8.4)
PROT UR QL STRIP: NEGATIVE
RBC # BLD AUTO: 4.21 M/UL (ref 4–5.4)
SODIUM SERPL-SCNC: 139 MMOL/L (ref 136–145)
SP GR UR STRIP: 1 (ref 1–1.03)
TROPONIN I SERPL DL<=0.01 NG/ML-MCNC: <0.006 NG/ML (ref 0–0.03)
URN SPEC COLLECT METH UR: ABNORMAL
WBC # BLD AUTO: 5.19 K/UL (ref 3.9–12.7)

## 2020-09-24 PROCEDURE — 25000003 PHARM REV CODE 250: Performed by: PHYSICIAN ASSISTANT

## 2020-09-24 PROCEDURE — 99285 EMERGENCY DEPT VISIT HI MDM: CPT | Mod: ,,, | Performed by: PHYSICIAN ASSISTANT

## 2020-09-24 PROCEDURE — 83615 LACTATE (LD) (LDH) ENZYME: CPT

## 2020-09-24 PROCEDURE — 93010 EKG 12-LEAD: ICD-10-PCS | Mod: ,,, | Performed by: INTERNAL MEDICINE

## 2020-09-24 PROCEDURE — 80053 COMPREHEN METABOLIC PANEL: CPT

## 2020-09-24 PROCEDURE — 96360 HYDRATION IV INFUSION INIT: CPT

## 2020-09-24 PROCEDURE — 86140 C-REACTIVE PROTEIN: CPT

## 2020-09-24 PROCEDURE — 82728 ASSAY OF FERRITIN: CPT

## 2020-09-24 PROCEDURE — 99285 PR EMERGENCY DEPT VISIT,LEVEL V: ICD-10-PCS | Mod: ,,, | Performed by: PHYSICIAN ASSISTANT

## 2020-09-24 PROCEDURE — 96361 HYDRATE IV INFUSION ADD-ON: CPT

## 2020-09-24 PROCEDURE — 93005 ELECTROCARDIOGRAM TRACING: CPT

## 2020-09-24 PROCEDURE — 85025 COMPLETE CBC W/AUTO DIFF WBC: CPT

## 2020-09-24 PROCEDURE — 93010 ELECTROCARDIOGRAM REPORT: CPT | Mod: ,,, | Performed by: INTERNAL MEDICINE

## 2020-09-24 PROCEDURE — 83880 ASSAY OF NATRIURETIC PEPTIDE: CPT

## 2020-09-24 PROCEDURE — 84484 ASSAY OF TROPONIN QUANT: CPT

## 2020-09-24 PROCEDURE — 81003 URINALYSIS AUTO W/O SCOPE: CPT

## 2020-09-24 PROCEDURE — 82550 ASSAY OF CK (CPK): CPT

## 2020-09-24 PROCEDURE — 99285 EMERGENCY DEPT VISIT HI MDM: CPT | Mod: 25

## 2020-09-24 RX ORDER — BENZONATATE 100 MG/1
100 CAPSULE ORAL 3 TIMES DAILY PRN
Qty: 15 CAPSULE | Refills: 0 | Status: SHIPPED | OUTPATIENT
Start: 2020-09-24 | End: 2020-10-04

## 2020-09-24 RX ADMIN — SODIUM CHLORIDE 1000 ML: 0.9 INJECTION, SOLUTION INTRAVENOUS at 09:09

## 2020-09-24 NOTE — ED NOTES
Patient ambulated down the hallway. Initial  and oxygen saturation 96% on room air. While and after ambulating patient maintained steady oxygen and HR with the end results of: HR - 98 and oxygen saturation 95% on room air.

## 2020-09-24 NOTE — ED TRIAGE NOTES
"Patient is a 75 year old female that presents to ED with c/o weakness, decreased appetite, and loose "pasty stools". Patient tested +COIVD on September 11th. Last fever couple days ago states highest was 101.5. denies SOB and chest pain.    "

## 2020-09-24 NOTE — ED NOTES
Laboratory called about add-ons for blood work. Informed to recollect only LDH. Provider informed of pending blood work.

## 2020-09-24 NOTE — ED PROVIDER NOTES
Encounter Date: 2020       History     Chief Complaint   Patient presents with    Fatigue     covid + since the , feeling weak , freq paste like stools     75-year-old female with HTN, dm type 2 presents to the ED with a chief fatigue.  Patient was diagnosed with COVID-19 on .  She reports progressively worsening fatigue and generalized weakness.  Patient reports that she dehydrated.  She has a poor appetite.  She has a mild cough, but denies shortness of breath chest pain.  She has occasional lightheadedness.  She reports that she has good days and bad days.  She reports her chills body aches have improved.  Patient also reports pasty yellow brown stool.  No watery stool.  No dark bloody stool.  Denies abdominal pain, n/v, change in urination.  She reports a history of anxiety. Takes Paxil for this.           Review of patient's allergies indicates:   Allergen Reactions    Codeine Nausea And Vomiting    Pneumoc 13-frida conj-dip cr(pf) Other (See Comments) and Shortness Of Breath     Asthma    Pneumococcal 23-frida ps vaccine Other (See Comments) and Shortness Of Breath     Asthuma    Erythromycin     Mycinette [cetylpyridinium-benzocaine]     Penicillins     Sulfur     Tetracyclines     Influenza virus vaccines Hives and Rash     Past Medical History:   Diagnosis Date    Diabetes mellitus, type 2     Hypertension      Past Surgical History:   Procedure Laterality Date    APPENDECTOMY      BREAST BIOPSY      CATARACT EXTRACTION       SECTION      TUBAL LIGATION       Family History   Problem Relation Age of Onset    Cancer Mother     Hypertension Mother     Diabetes Mother     Hypertension Father     Diabetes Father     Heart disease Father     Diabetes Brother     Heart disease Brother     Heart attack Brother     Stroke Brother      Social History     Tobacco Use    Smoking status: Former Smoker    Smokeless tobacco: Never Used   Substance Use Topics    Alcohol use:  No    Drug use: No     Review of Systems   Constitutional: Positive for fatigue. Negative for fever.   HENT: Negative for sore throat.    Respiratory: Positive for cough. Negative for shortness of breath.    Cardiovascular: Negative for chest pain.   Gastrointestinal: Negative for abdominal pain, diarrhea, nausea and vomiting.   Genitourinary: Negative for dysuria.   Musculoskeletal: Negative for back pain and myalgias.   Skin: Negative for rash.   Neurological: Positive for weakness and light-headedness.   Hematological: Does not bruise/bleed easily.       Physical Exam     Initial Vitals [09/24/20 0907]   BP Pulse Resp Temp SpO2   (!) 209/90 88 18 98 °F (36.7 °C) 95 %      MAP       --         Physical Exam    Nursing note and vitals reviewed.  Constitutional: She appears well-developed and well-nourished. She is not diaphoretic.  Non-toxic appearance. She does not appear ill. No distress.   HENT:   Head: Normocephalic and atraumatic.   Neck: Neck supple.   Cardiovascular: Normal rate and regular rhythm. Exam reveals no gallop and no friction rub.    No murmur heard.  Pulmonary/Chest: Effort normal and breath sounds normal. No accessory muscle usage. No tachypnea. No respiratory distress. She has no decreased breath sounds. She has no wheezes. She has no rhonchi. She has no rales.   Abdominal: Soft. Normal appearance. She exhibits no distension and no mass. There is no abdominal tenderness. There is no rigidity and no guarding.   Musculoskeletal:      Comments: No peripheral edema   Neurological: She is alert.   Skin: No rash noted.   Psychiatric: She has a normal mood and affect. Her behavior is normal.         ED Course   Procedures  Labs Reviewed   URINALYSIS, REFLEX TO URINE CULTURE - Abnormal; Notable for the following components:       Result Value    Specific Gravity, UA 1.000 (*)     All other components within normal limits    Narrative:     Specimen Source->Urine   CBC W/ AUTO DIFFERENTIAL - Abnormal;  Notable for the following components:    Hematocrit 36.4 (*)     RDW 15.1 (*)     All other components within normal limits   COMPREHENSIVE METABOLIC PANEL - Abnormal; Notable for the following components:    Glucose 162 (*)     BUN, Bld 5 (*)     All other components within normal limits   LACTATE DEHYDROGENASE - Abnormal; Notable for the following components:     (*)     All other components within normal limits   C-REACTIVE PROTEIN - Abnormal; Notable for the following components:    CRP 19.2 (*)     All other components within normal limits    Narrative:     add on crp, cpk, steven per [facility] 09/24/2020  12:40    TROPONIN I   B-TYPE NATRIURETIC PEPTIDE   C-REACTIVE PROTEIN   FERRITIN   CK   CK    Narrative:     add on crp, cpk, steven per [facility] 09/24/2020  12:40    FERRITIN    Narrative:     add on crp, cpk, steven per [facility] 09/24/2020  12:40      EKG Readings: (Independently Interpreted)   Initial Reading: No STEMI. Rhythm: Normal Sinus Rhythm. Heart Rate: 77. Conduction: Normal. Axis: Normal.     ECG Results          EKG 12-lead (Final result)  Result time 09/24/20 13:06:45    Final result by Interface, Lab In [facility] (09/24/20 13:06:45)                 Narrative:    Test Reason : R53.83,    Vent. Rate : 077 BPM     Atrial Rate : 077 BPM     P-R Int : 176 ms          QRS Dur : 094 ms      QT Int : 398 ms       P-R-T Axes : 035 029 017 degrees     QTc Int : 450 ms    Normal sinus rhythm  Normal ECG  When compared with ECG of 28-JUN-2001 15:45,  Nonspecific T wave abnormality no longer evident in Anterior leads  Confirmed by NAEL KERR MD ([id number]) on 9/24/2020 1:06:36 PM    Referred By: [email or web address]   SELF           Confirmed By:NAEL KERR MD                            Imaging Results          X-Ray Chest AP Portable (Final result)  Result time 09/24/20 09:38:57    Final result by Daron Barreto MD (09/24/20 09:38:57)                 Impression:      Few subtle patchy  areas of opacity in the right middle and left lower lung zones which may be seen with pneumonia including viral pneumonia.      Electronically signed by: Daron Barreto MD  Date:    09/24/2020  Time:    09:38             Narrative:    EXAMINATION:  XR CHEST AP PORTABLE    CLINICAL HISTORY:  Other fatigue    TECHNIQUE:  Single frontal view of the chest was performed.    COMPARISON:  Multiple prior exams, most recent from 10/11/2011    FINDINGS:  No cardiomegaly.  Normal pulmonary vasculature.  A few subtle patchy areas of opacity in the right middle and left lower lung zone which may be seen with pneumonia including viral pneumonia.  No pleural effusion.  No pneumothorax.  Osseous structures are unremarkable.                                 Medical Decision Making:   History:   Old Medical Records: I decided to obtain old medical records.  Differential Diagnosis:   My differential diagnosis includes but is not limited to:  COVID 19 infection, viral syndrome, dehydration, anemia, metabolic derangement, DANIEL  Independently Interpreted Test(s):   I have ordered and independently interpreted EKG Reading(s) - see prior notes  Clinical Tests:   Lab Tests: Ordered  Radiological Study: Ordered  Medical Tests: Ordered       APC / Resident Notes:   75-year-old female with a positive COVID test from approximately 2 weeks ago presents to the ED with persistent fatigue and generalized weakness.  She is hypertensive on initial evaluation at 209/90.  Patient did take her BP medicine this morning.  Vitals otherwise within normal limits.  Afebrile.  Nontoxic appearing and in no acute distress.  Ambulatory O2 sat 96%.     Chest x-ray shows subtle patchy opacities in right middle and left lower lung zones.  LDH is elevated at 364.  CRP slightly elevated at 19.2.  CPK and ferritin are within normal limits.  Patient reports some improvement after 1 L of IV fluids.  Based on patient's exam and workup, feel that she is stable for  discharge and outpatient management COVID.  Patient is about 2 weeks from her positive COVID test.  I do not feel treatment for pneumonia with antibiotics is indicated as her pneumonia is likely viral in nature.  I will discharge with Tessalon Perles for cough.  Encouraged increased oral fluids and rest.  Urged patient to follow up closely with her primary care provider for re-evaluation.  Strict ED return precautions given.  Patient verbalized understanding and is comfortable with this plan.  I have reviewed the patient's records and discussed this case with my supervising physician. Please be advised that this text was dictated with [employer] software and may contain dictation errors.                               Clinical Impression:       ICD-10-CM ICD-9-CM   1. COVID-19 virus infection  U07.1    2. Fatigue  R53.83 780.79                      Disposition:   Disposition: Discharged  Condition: Stable     ED Disposition Condition    Discharge Stable        ED Prescriptions     Medication Sig Dispense Start Date End Date Auth. Provider    benzonatate (TESSALON) 100 MG capsule Take 1 capsule (100 mg total) by mouth 3 (three) times daily as needed for Cough. 15 capsule 9/24/2020 10/4/2020 Audra Ahuja PA-C        Follow-up Information     Follow up With Specialties Details Why Contact Info    Nikhil Reilly MD Internal Medicine Schedule an appointment as soon as possible for a visit on 9/28/2020 For reevaluation [address] [place] [id number]  [phone]                                         Audra Ahuja PA-C  09/24/20 [id number]

## 2020-09-24 NOTE — DISCHARGE INSTRUCTIONS
Rest.  Drink plenty of fluids.  Follow up with your primary care doctor on Monday or early next week for re-evaluation.  You can continue to take Tylenol as needed for body aches or fever.  You take your prescription cough medicine as needed.    Return to the ER for any new or significantly worsening symptoms such as worsening weakness, uncontrolled vomiting, difficulty breathing or any other worrisome symptoms.

## 2020-11-03 ENCOUNTER — OFFICE VISIT (OUTPATIENT)
Dept: CARDIOLOGY | Facility: CLINIC | Age: 75
End: 2020-11-03
Payer: MEDICARE

## 2020-11-03 VITALS
SYSTOLIC BLOOD PRESSURE: 132 MMHG | OXYGEN SATURATION: 97 % | BODY MASS INDEX: 32.96 KG/M2 | HEART RATE: 86 BPM | DIASTOLIC BLOOD PRESSURE: 78 MMHG | WEIGHT: 192 LBS

## 2020-11-03 DIAGNOSIS — I70.209 ATHEROSCLEROTIC PERIPHERAL VASCULAR DISEASE: ICD-10-CM

## 2020-11-03 DIAGNOSIS — I10 ESSENTIAL HYPERTENSION: ICD-10-CM

## 2020-11-03 DIAGNOSIS — E11.9 TYPE 2 DIABETES MELLITUS WITHOUT COMPLICATION, WITHOUT LONG-TERM CURRENT USE OF INSULIN: Primary | ICD-10-CM

## 2020-11-03 DIAGNOSIS — E78.00 HYPERCHOLESTEROLEMIA: ICD-10-CM

## 2020-11-03 PROCEDURE — 3078F DIAST BP <80 MM HG: CPT | Mod: CPTII,S$GLB,, | Performed by: INTERNAL MEDICINE

## 2020-11-03 PROCEDURE — 99214 PR OFFICE/OUTPT VISIT, EST, LEVL IV, 30-39 MIN: ICD-10-PCS | Mod: S$GLB,,, | Performed by: INTERNAL MEDICINE

## 2020-11-03 PROCEDURE — 3078F PR MOST RECENT DIASTOLIC BLOOD PRESSURE < 80 MM HG: ICD-10-PCS | Mod: CPTII,S$GLB,, | Performed by: INTERNAL MEDICINE

## 2020-11-03 PROCEDURE — 1126F PR PAIN SEVERITY QUANTIFIED, NO PAIN PRESENT: ICD-10-PCS | Mod: S$GLB,,, | Performed by: INTERNAL MEDICINE

## 2020-11-03 PROCEDURE — 99999 PR PBB SHADOW E&M-EST. PATIENT-LVL III: ICD-10-PCS | Mod: PBBFAC,,, | Performed by: INTERNAL MEDICINE

## 2020-11-03 PROCEDURE — 3008F PR BODY MASS INDEX (BMI) DOCUMENTED: ICD-10-PCS | Mod: CPTII,S$GLB,, | Performed by: INTERNAL MEDICINE

## 2020-11-03 PROCEDURE — 3075F PR MOST RECENT SYSTOLIC BLOOD PRESS GE 130-139MM HG: ICD-10-PCS | Mod: CPTII,S$GLB,, | Performed by: INTERNAL MEDICINE

## 2020-11-03 PROCEDURE — 99999 PR PBB SHADOW E&M-EST. PATIENT-LVL III: CPT | Mod: PBBFAC,,, | Performed by: INTERNAL MEDICINE

## 2020-11-03 PROCEDURE — 3075F SYST BP GE 130 - 139MM HG: CPT | Mod: CPTII,S$GLB,, | Performed by: INTERNAL MEDICINE

## 2020-11-03 PROCEDURE — 1159F MED LIST DOCD IN RCRD: CPT | Mod: S$GLB,,, | Performed by: INTERNAL MEDICINE

## 2020-11-03 PROCEDURE — 99214 OFFICE O/P EST MOD 30 MIN: CPT | Mod: S$GLB,,, | Performed by: INTERNAL MEDICINE

## 2020-11-03 PROCEDURE — 1159F PR MEDICATION LIST DOCUMENTED IN MEDICAL RECORD: ICD-10-PCS | Mod: S$GLB,,, | Performed by: INTERNAL MEDICINE

## 2020-11-03 PROCEDURE — 3008F BODY MASS INDEX DOCD: CPT | Mod: CPTII,S$GLB,, | Performed by: INTERNAL MEDICINE

## 2020-11-03 PROCEDURE — 1126F AMNT PAIN NOTED NONE PRSNT: CPT | Mod: S$GLB,,, | Performed by: INTERNAL MEDICINE

## 2020-11-03 RX ORDER — GLIPIZIDE 2.5 MG/1
2.5 TABLET, EXTENDED RELEASE ORAL
COMMUNITY
Start: 2020-10-01 | End: 2021-10-01

## 2020-11-03 RX ORDER — ASCORBIC ACID 500 MG
500 TABLET ORAL DAILY
COMMUNITY

## 2020-11-03 NOTE — PROGRESS NOTES
[facility]    Chief Complaint  Chief Complaint   Patient presents with    Peripheral Arterial Disease       HPI:  Hospitalized last month with COVID.  Has completely recovered.  Remains active without limiting chest discomfort, dyspnea or claudication.    Medications  Current Outpatient Medications   Medication Sig Dispense Refill    ascorbic acid, vitamin C, (VITAMIN C) 500 MG tablet Take 500 mg by mouth once daily.      aspirin (ECOTRIN) 81 MG EC tablet Take 81 mg by mouth once daily.      atorvastatin (LIPITOR) 20 MG tablet Take 20 mg by mouth.      blood sugar diagnostic (CONTOUR NEXT TEST STRIPS) [facility] CHECK BLOOD SUGAR THREE TIMES A DAY      ergocalciferol, vitamin D2, (VITAMIN D ORAL) Take by mouth.      glipiZIDE (GLUCOTROL) 2.5 MG TR24 Take 2.5 mg by mouth.      olmesartan (BENICAR) 20 MG tablet TAKE 1 TABLET BY MOUTH EVERY DAY      omeprazole (PRILOSEC) 20 MG capsule Take 20 mg by mouth.      paroxetine (PAXIL) 10 MG tablet TAKE 1 TABLET BY MOUTH EVERY DAY       No current facility-administered medications for this visit.         History  Past Medical History:   Diagnosis Date    Anxiety     Atherosclerotic peripheral vascular disease     Diabetes mellitus, type 2     Gastroesophageal reflux disease     Hypercholesterolemia     Hypertension      Past Surgical History:   Procedure Laterality Date    APPENDECTOMY      BREAST BIOPSY Left     CATARACT EXTRACTION       SECTION      TUBAL LIGATION       Social History     Socioeconomic History    Marital status: Single     Spouse name: Not on file    Number of children: Not on file    Years of education: Not on file    Highest education level: Not on file   Occupational History    Not on file   Social Needs    Financial resource strain: Not on file    Food insecurity     Worry: Not on file     Inability: Not on file    Transportation needs     Medical: Not on file     Non-medical: Not on file   Tobacco Use     Smoking status: Never Smoker    Smokeless tobacco: Never Used   Substance and Sexual Activity    Alcohol use: No    Drug use: No    Sexual activity: Never   Lifestyle    Physical activity     Days per week: Not on file     Minutes per session: Not on file    Stress: Not on file   Relationships    Social connections     Talks on phone: Not on file     Gets together: Not on file     Attends Yazidism service: Not on file     Active member of club or organization: Not on file     Attends meetings of clubs or organizations: Not on file     Relationship status: Not on file   Other Topics Concern    Not on file   Social History Narrative    Not on file     Family History   Problem Relation Age of Onset    Cancer Mother     Hypertension Mother     Diabetes Mother     Hypertension Father     Diabetes Father     Heart disease Father     Diabetes Brother     Heart disease Brother     Heart attack Brother     Stroke Brother         Allergies  Review of patient's allergies indicates:   Allergen Reactions    Codeine Nausea And Vomiting    Pneumoc 13-frida conj-dip cr(pf) Other (See Comments) and Shortness Of Breath     Asthma    Pneumococcal 23-frida ps vaccine Other (See Comments) and Shortness Of Breath     Asthuma    Erythromycin     Mycinette [cetylpyridinium-benzocaine]     Penicillins     Sulfur     Tetracyclines     Influenza virus vaccines Hives and Rash       Review of Systems   Review of Systems   Constitution: Negative for malaise/fatigue, weight gain and weight loss.   Eyes: Negative for visual disturbance.   Cardiovascular: Negative for chest pain, claudication, cyanosis, dyspnea on exertion, irregular heartbeat, leg swelling, near-syncope, orthopnea, palpitations, paroxysmal nocturnal dyspnea and syncope.   Respiratory: Negative for cough, hemoptysis, shortness of breath, sleep disturbances due to breathing and wheezing.    Hematologic/Lymphatic: Negative for bleeding problem. Does not  bruise/bleed easily.   Skin: Negative for poor wound healing.   Musculoskeletal: Negative for muscle cramps and myalgias.   Gastrointestinal: Negative for abdominal pain, anorexia, diarrhea, heartburn, hematemesis, hematochezia, melena, nausea and vomiting.   Genitourinary: Negative for hematuria and nocturia.   Neurological: Negative for excessive daytime sleepiness, dizziness, focal weakness, light-headedness and weakness.       Physical Exam  Vitals:    11/03/20 1044   BP: 132/78   Pulse: 86     Wt Readings from Last 1 Encounters:   11/03/20 87.1 kg (192 lb 0.3 oz)     Physical Exam   Constitutional: She is oriented to person, place, and time. She is cooperative.  Non-toxic appearance. No distress.   HENT:   Head: Normocephalic and atraumatic.   Eyes: Conjunctivae are normal. No scleral icterus.   Neck: Neck supple. No hepatojugular reflux and no JVD present. Carotid bruit is not present. No tracheal deviation present. No thyromegaly present.   Cardiovascular: Normal rate, regular rhythm, S1 normal and S2 normal.  No extrasystoles are present. PMI is not displaced. Exam reveals no S3 and no S4.   No murmur heard.  Pulses:       Carotid pulses are 2+ on the right side and 2+ on the left side.       Radial pulses are 2+ on the right side and 2+ on the left side.        Dorsalis pedis pulses are 2+ on the right side and 2+ on the left side.        Posterior tibial pulses are 2+ on the right side and 2+ on the left side.   Pulmonary/Chest: No accessory muscle usage. No respiratory distress. She has no decreased breath sounds. She has no wheezes. She has no rhonchi. She has no rales.   Abdominal: Soft. Bowel sounds are normal. She exhibits no pulsatile liver, no abdominal bruit and no pulsatile midline mass. There is no splenomegaly or hepatomegaly. There is no abdominal tenderness.   Musculoskeletal:         General: No tenderness, deformity or edema.   Neurological: She is alert and oriented to person, place, and  time. She has normal strength. No cranial nerve deficit or sensory deficit.   Skin: Skin is warm, dry and intact. She is not diaphoretic. No cyanosis. No pallor. Nails show no clubbing.   Psychiatric: She has a normal mood and affect. Her speech is normal and behavior is normal.       Labs  Admission on 09/24/2020, Discharged on 09/24/2020   Component Date Value Ref Range Status    Specimen UA 09/24/2020 Urine, Clean Catch   Final    Color, UA 09/24/2020 Straw  Yellow, Straw, Deborah Final    Appearance, UA 09/24/2020 Clear  Clear Final    pH, UA 09/24/2020 7.0  5.0 - 8.0 Final    Specific Gravity, UA 09/24/2020 1.000* 1.005 - 1.030 Final    Protein, UA 09/24/2020 Negative  Negative Final    Comment: Recommend a 24 hour urine protein or a urine   protein/creatinine ratio if globulin induced proteinuria is  clinically suspected.      Glucose, UA 09/24/2020 Negative  Negative Final    Ketones, UA 09/24/2020 Negative  Negative Final    Bilirubin (UA) 09/24/2020 Negative  Negative Final    Occult Blood UA 09/24/2020 Negative  Negative Final    Nitrite, UA 09/24/2020 Negative  Negative Final    Leukocytes, UA 09/24/2020 Negative  Negative Final    WBC 09/24/2020 5.19  3.90 - 12.70 K/uL Final    RBC 09/24/2020 4.21  4.00 - 5.40 M/uL Final    Hemoglobin 09/24/2020 12.4  12.0 - 16.0 g/dL Final    Hematocrit 09/24/2020 36.4* 37.0 - 48.5 % Final    MCV 09/24/2020 87  82 - 98 fL Final    MCH 09/24/2020 29.5  27.0 - 31.0 pg Final    MCHC 09/24/2020 34.1  32.0 - 36.0 g/dL Final    RDW 09/24/2020 15.1* 11.5 - 14.5 % Final    Platelets 09/24/2020 206  150 - 350 K/uL Final    MPV 09/24/2020 12.0  9.2 - 12.9 fL Final    Immature Granulocytes 09/24/2020 0.4  0.0 - 0.5 % Final    Gran # (ANC) 09/24/2020 3.4  1.8 - 7.7 K/uL Final    Immature Grans (Abs) 09/24/2020 0.02  0.00 - 0.04 K/uL Final    Comment: Mild elevation in immature granulocytes is non specific and   can be seen in a variety of conditions including  stress response,   acute inflammation, trauma and pregnancy. Correlation with other   laboratory and clinical findings is essential.      Lymph # 09/24/2020 1.5  1.0 - 4.8 K/uL Final    Mono # 09/24/2020 0.3  0.3 - 1.0 K/uL Final    Eos # 09/24/2020 0.0  0.0 - 0.5 K/uL Final    Baso # 09/24/2020 0.03  0.00 - 0.20 K/uL Final    nRBC 09/24/2020 0  0 /100 WBC Final    Gran % 09/24/2020 65.3  38.0 - 73.0 % Final    Lymph % 09/24/2020 27.9  18.0 - 48.0 % Final    Mono % 09/24/2020 5.0  4.0 - 15.0 % Final    Eosinophil % 09/24/2020 0.8  0.0 - 8.0 % Final    Basophil % 09/24/2020 0.6  0.0 - 1.9 % Final    Platelet Estimate 09/24/2020 Appears normal   Final    Differential Method 09/24/2020 Automated   Final    Sodium 09/24/2020 139  136 - 145 mmol/L Final    Potassium 09/24/2020 3.7  3.5 - 5.1 mmol/L Final    Chloride 09/24/2020 104  95 - 110 mmol/L Final    CO2 09/24/2020 27  23 - 29 mmol/L Final    Glucose 09/24/2020 162* 70 - 110 mg/dL Final    BUN 09/24/2020 5* 8 - 23 mg/dL Final    Creatinine 09/24/2020 0.7  0.5 - 1.4 mg/dL Final    Calcium 09/24/2020 9.0  8.7 - 10.5 mg/dL Final    Total Protein 09/24/2020 7.1  6.0 - 8.4 g/dL Final    Albumin 09/24/2020 3.5  3.5 - 5.2 g/dL Final    Total Bilirubin 09/24/2020 0.4  0.1 - 1.0 mg/dL Final    Comment: For infants and newborns, interpretation of results should be based  on gestational age, weight and in agreement with clinical  observations.  Premature Infant recommended reference ranges:  Up to 24 hours.............<8.0 mg/dL  Up to 48 hours............<12.0 mg/dL  3-5 days..................<15.0 mg/dL  6-29 days.................<15.0 mg/dL      Alkaline Phosphatase 09/24/2020 62  55 - 135 U/L Final    AST 09/24/2020 17  10 - 40 U/L Final    ALT 09/24/2020 13  10 - 44 U/L Final    Anion Gap 09/24/2020 8  8 - 16 mmol/L Final    eGFR if African American 09/24/2020 >60.0  >60 mL/min/1.73 m^2 Final    eGFR if non African American 09/24/2020 >60.0   >60 mL/min/1.73 m^2 Final    Comment: Calculation used to obtain the estimated glomerular filtration  rate (eGFR) is the CKD-EPI equation.       Troponin I 09/24/2020 <0.006  0.000 - 0.026 ng/mL Final    Comment: The reference interval for Troponin I represents the 99th percentile   cutoff   for our facility and is consistent with 3rd generation assay   performance.      BNP 09/24/2020 41  0 - 99 pg/mL Final    Values of less than 100 pg/ml are consistent with non-CHF populations.    LD 09/24/2020 364* 110 - 260 U/L Final    Comment: Results are increased in hemolyzed samples.  *Result may be interfered by visible hemolysis      CRP 09/24/2020 19.2* 0.0 - 8.2 mg/L Final    CPK 09/24/2020 46  20 - 180 U/L Final    Ferritin 09/24/2020 118  20.0 - 300.0 ng/mL Final       Imaging  No results found.    Assessment  1. Atherosclerotic peripheral vascular disease  Stable and asymptomatic    2. Hypercholesterolemia  Controlled    3. Essential hypertension  Controlled    4. Type 2 diabetes mellitus without complication, without long-term current use of insulin  Per Dr. Bales      Plan and Discussion    Continue current guideline directed medical therapy.  Remain active.      Follow Up  Follow up in about 1 year (around 11/3/2021).      Jh Mata MD

## 2021-01-22 ENCOUNTER — PATIENT MESSAGE (OUTPATIENT)
Dept: ADMINISTRATIVE | Facility: OTHER | Age: 76
End: 2021-01-22

## 2021-04-15 ENCOUNTER — PATIENT MESSAGE (OUTPATIENT)
Dept: RESEARCH | Facility: HOSPITAL | Age: 76
End: 2021-04-15

## 2021-11-16 ENCOUNTER — OFFICE VISIT (OUTPATIENT)
Dept: CARDIOLOGY | Facility: CLINIC | Age: 76
End: 2021-11-16
Payer: MEDICARE

## 2021-11-16 VITALS
WEIGHT: 198.69 LBS | DIASTOLIC BLOOD PRESSURE: 76 MMHG | HEART RATE: 80 BPM | SYSTOLIC BLOOD PRESSURE: 132 MMHG | BODY MASS INDEX: 34.11 KG/M2

## 2021-11-16 DIAGNOSIS — E78.00 HYPERCHOLESTEROLEMIA: ICD-10-CM

## 2021-11-16 DIAGNOSIS — I10 PRIMARY HYPERTENSION: ICD-10-CM

## 2021-11-16 DIAGNOSIS — I10 ESSENTIAL HYPERTENSION: ICD-10-CM

## 2021-11-16 DIAGNOSIS — I70.209 ATHEROSCLEROTIC PERIPHERAL VASCULAR DISEASE: ICD-10-CM

## 2021-11-16 DIAGNOSIS — E11.51 TYPE 2 DIABETES MELLITUS WITH DIABETIC PERIPHERAL ANGIOPATHY WITHOUT GANGRENE, WITHOUT LONG-TERM CURRENT USE OF INSULIN: ICD-10-CM

## 2021-11-16 DIAGNOSIS — I20.89 OTHER FORMS OF ANGINA PECTORIS: Primary | ICD-10-CM

## 2021-11-16 PROCEDURE — 3288F FALL RISK ASSESSMENT DOCD: CPT | Mod: CPTII,S$GLB,, | Performed by: INTERNAL MEDICINE

## 2021-11-16 PROCEDURE — 1160F PR REVIEW ALL MEDS BY PRESCRIBER/CLIN PHARMACIST DOCUMENTED: ICD-10-PCS | Mod: CPTII,S$GLB,, | Performed by: INTERNAL MEDICINE

## 2021-11-16 PROCEDURE — 3078F DIAST BP <80 MM HG: CPT | Mod: CPTII,S$GLB,, | Performed by: INTERNAL MEDICINE

## 2021-11-16 PROCEDURE — 3008F PR BODY MASS INDEX (BMI) DOCUMENTED: ICD-10-PCS | Mod: CPTII,S$GLB,, | Performed by: INTERNAL MEDICINE

## 2021-11-16 PROCEDURE — 1101F PT FALLS ASSESS-DOCD LE1/YR: CPT | Mod: CPTII,S$GLB,, | Performed by: INTERNAL MEDICINE

## 2021-11-16 PROCEDURE — 99215 PR OFFICE/OUTPT VISIT, EST, LEVL V, 40-54 MIN: ICD-10-PCS | Mod: S$GLB,,, | Performed by: INTERNAL MEDICINE

## 2021-11-16 PROCEDURE — 1125F PR PAIN SEVERITY QUANTIFIED, PAIN PRESENT: ICD-10-PCS | Mod: CPTII,S$GLB,, | Performed by: INTERNAL MEDICINE

## 2021-11-16 PROCEDURE — 3008F BODY MASS INDEX DOCD: CPT | Mod: CPTII,S$GLB,, | Performed by: INTERNAL MEDICINE

## 2021-11-16 PROCEDURE — 3288F PR FALLS RISK ASSESSMENT DOCUMENTED: ICD-10-PCS | Mod: CPTII,S$GLB,, | Performed by: INTERNAL MEDICINE

## 2021-11-16 PROCEDURE — 1101F PR PT FALLS ASSESS DOC 0-1 FALLS W/OUT INJ PAST YR: ICD-10-PCS | Mod: CPTII,S$GLB,, | Performed by: INTERNAL MEDICINE

## 2021-11-16 PROCEDURE — 3078F PR MOST RECENT DIASTOLIC BLOOD PRESSURE < 80 MM HG: ICD-10-PCS | Mod: CPTII,S$GLB,, | Performed by: INTERNAL MEDICINE

## 2021-11-16 PROCEDURE — 99999 PR PBB SHADOW E&M-EST. PATIENT-LVL III: ICD-10-PCS | Mod: PBBFAC,,, | Performed by: INTERNAL MEDICINE

## 2021-11-16 PROCEDURE — 3075F SYST BP GE 130 - 139MM HG: CPT | Mod: CPTII,S$GLB,, | Performed by: INTERNAL MEDICINE

## 2021-11-16 PROCEDURE — 4010F PR ACE/ARB THEARPY RXD/TAKEN: ICD-10-PCS | Mod: CPTII,S$GLB,, | Performed by: INTERNAL MEDICINE

## 2021-11-16 PROCEDURE — 4010F ACE/ARB THERAPY RXD/TAKEN: CPT | Mod: CPTII,S$GLB,, | Performed by: INTERNAL MEDICINE

## 2021-11-16 PROCEDURE — 3075F PR MOST RECENT SYSTOLIC BLOOD PRESS GE 130-139MM HG: ICD-10-PCS | Mod: CPTII,S$GLB,, | Performed by: INTERNAL MEDICINE

## 2021-11-16 PROCEDURE — 1159F PR MEDICATION LIST DOCUMENTED IN MEDICAL RECORD: ICD-10-PCS | Mod: CPTII,S$GLB,, | Performed by: INTERNAL MEDICINE

## 2021-11-16 PROCEDURE — 1159F MED LIST DOCD IN RCRD: CPT | Mod: CPTII,S$GLB,, | Performed by: INTERNAL MEDICINE

## 2021-11-16 PROCEDURE — 99999 PR PBB SHADOW E&M-EST. PATIENT-LVL III: CPT | Mod: PBBFAC,,, | Performed by: INTERNAL MEDICINE

## 2021-11-16 PROCEDURE — 99215 OFFICE O/P EST HI 40 MIN: CPT | Mod: S$GLB,,, | Performed by: INTERNAL MEDICINE

## 2021-11-16 PROCEDURE — 1125F AMNT PAIN NOTED PAIN PRSNT: CPT | Mod: CPTII,S$GLB,, | Performed by: INTERNAL MEDICINE

## 2021-11-16 PROCEDURE — 1160F RVW MEDS BY RX/DR IN RCRD: CPT | Mod: CPTII,S$GLB,, | Performed by: INTERNAL MEDICINE

## 2021-11-16 RX ORDER — MELOXICAM 15 MG/1
15 TABLET ORAL DAILY
COMMUNITY
Start: 2021-08-12 | End: 2022-05-26

## 2021-11-16 RX ORDER — AMLODIPINE BESYLATE 2.5 MG/1
2.5 TABLET ORAL DAILY
COMMUNITY
Start: 2021-07-28 | End: 2022-05-26

## 2021-11-16 RX ORDER — GLIPIZIDE 2.5 MG/1
2.5 TABLET, EXTENDED RELEASE ORAL
COMMUNITY
Start: 2021-11-16

## 2021-11-16 RX ORDER — OLMESARTAN MEDOXOMIL 40 MG/1
20 TABLET ORAL DAILY
COMMUNITY
Start: 2021-10-05

## 2021-12-16 ENCOUNTER — HOSPITAL ENCOUNTER (OUTPATIENT)
Dept: CARDIOLOGY | Facility: OTHER | Age: 76
Discharge: HOME | End: 2021-12-16
Attending: INTERNAL MEDICINE
Payer: MEDICARE

## 2021-12-16 VITALS
WEIGHT: 198 LBS | HEART RATE: 83 BPM | BODY MASS INDEX: 33.8 KG/M2 | DIASTOLIC BLOOD PRESSURE: 69 MMHG | SYSTOLIC BLOOD PRESSURE: 139 MMHG | HEIGHT: 64 IN

## 2021-12-16 DIAGNOSIS — I20.89 OTHER FORMS OF ANGINA PECTORIS: ICD-10-CM

## 2021-12-16 PROCEDURE — 93351 STRESS TTE COMPLETE: CPT

## 2021-12-16 PROCEDURE — 93351 STRESS ECHO (CUPID ONLY): ICD-10-PCS | Mod: 26,,, | Performed by: INTERNAL MEDICINE

## 2021-12-16 PROCEDURE — 93351 STRESS TTE COMPLETE: CPT | Mod: 26,,, | Performed by: INTERNAL MEDICINE

## 2021-12-17 LAB
BSA FOR ECHO PROCEDURE: 2.01 M2
CV ECHO LV RWT: 0.37 CM
CV STRESS BASE HR: 83 BPM
DIASTOLIC BLOOD PRESSURE: 69 MMHG
ECHO LV POSTERIOR WALL: 1 CM (ref 0.6–1.1)
FRACTIONAL SHORTENING: 28 % (ref 28–44)
INTERVENTRICULAR SEPTUM: 1 CM (ref 0.6–1.1)
LEFT INTERNAL DIMENSION IN SYSTOLE: 3.9 CM (ref 2.1–4)
LEFT VENTRICLE MASS INDEX: 106 G/M2
LEFT VENTRICULAR INTERNAL DIMENSION IN DIASTOLE: 5.4 CM (ref 3.5–6)
LEFT VENTRICULAR MASS: 206.74 G
OHS CV CPX 85 PERCENT MAX PREDICTED HEART RATE MALE: 120
OHS CV CPX ESTIMATED METS: 5
OHS CV CPX MAX PREDICTED HEART RATE: 141
OHS CV CPX PATIENT IS FEMALE: 1
OHS CV CPX PATIENT IS MALE: 0
OHS CV CPX PEAK DIASTOLIC BLOOD PRESSURE: 87 MMHG
OHS CV CPX PEAK HEAR RATE: 133 BPM
OHS CV CPX PEAK RATE PRESSURE PRODUCT: NORMAL
OHS CV CPX PEAK SYSTOLIC BLOOD PRESSURE: 245 MMHG
OHS CV CPX PERCENT MAX PREDICTED HEART RATE ACHIEVED: 94
OHS CV CPX RATE PRESSURE PRODUCT PRESENTING: NORMAL
STRESS ECHO POST EXERCISE DUR MIN: 3 MINUTES
STRESS ECHO POST EXERCISE DUR SEC: 1 SECONDS
SYSTOLIC BLOOD PRESSURE: 139 MMHG

## 2022-05-26 ENCOUNTER — OFFICE VISIT (OUTPATIENT)
Dept: CARDIOLOGY | Facility: CLINIC | Age: 77
End: 2022-05-26
Payer: MEDICARE

## 2022-05-26 VITALS
DIASTOLIC BLOOD PRESSURE: 62 MMHG | SYSTOLIC BLOOD PRESSURE: 132 MMHG | BODY MASS INDEX: 33.33 KG/M2 | WEIGHT: 194.19 LBS | HEART RATE: 70 BPM | OXYGEN SATURATION: 98 %

## 2022-05-26 DIAGNOSIS — E78.00 HYPERCHOLESTEROLEMIA: ICD-10-CM

## 2022-05-26 DIAGNOSIS — I70.209 ATHEROSCLEROTIC PERIPHERAL VASCULAR DISEASE: Primary | ICD-10-CM

## 2022-05-26 DIAGNOSIS — E11.51 TYPE 2 DIABETES MELLITUS WITH DIABETIC PERIPHERAL ANGIOPATHY WITHOUT GANGRENE, WITHOUT LONG-TERM CURRENT USE OF INSULIN: ICD-10-CM

## 2022-05-26 DIAGNOSIS — I10 PRIMARY HYPERTENSION: ICD-10-CM

## 2022-05-26 PROCEDURE — 3288F FALL RISK ASSESSMENT DOCD: CPT | Mod: CPTII,S$GLB,, | Performed by: INTERNAL MEDICINE

## 2022-05-26 PROCEDURE — 1126F PR PAIN SEVERITY QUANTIFIED, NO PAIN PRESENT: ICD-10-PCS | Mod: CPTII,S$GLB,, | Performed by: INTERNAL MEDICINE

## 2022-05-26 PROCEDURE — 3288F PR FALLS RISK ASSESSMENT DOCUMENTED: ICD-10-PCS | Mod: CPTII,S$GLB,, | Performed by: INTERNAL MEDICINE

## 2022-05-26 PROCEDURE — 4010F ACE/ARB THERAPY RXD/TAKEN: CPT | Mod: CPTII,S$GLB,, | Performed by: INTERNAL MEDICINE

## 2022-05-26 PROCEDURE — 1101F PR PT FALLS ASSESS DOC 0-1 FALLS W/OUT INJ PAST YR: ICD-10-PCS | Mod: CPTII,S$GLB,, | Performed by: INTERNAL MEDICINE

## 2022-05-26 PROCEDURE — 1126F AMNT PAIN NOTED NONE PRSNT: CPT | Mod: CPTII,S$GLB,, | Performed by: INTERNAL MEDICINE

## 2022-05-26 PROCEDURE — 1101F PT FALLS ASSESS-DOCD LE1/YR: CPT | Mod: CPTII,S$GLB,, | Performed by: INTERNAL MEDICINE

## 2022-05-26 PROCEDURE — 3078F DIAST BP <80 MM HG: CPT | Mod: CPTII,S$GLB,, | Performed by: INTERNAL MEDICINE

## 2022-05-26 PROCEDURE — 1159F PR MEDICATION LIST DOCUMENTED IN MEDICAL RECORD: ICD-10-PCS | Mod: CPTII,S$GLB,, | Performed by: INTERNAL MEDICINE

## 2022-05-26 PROCEDURE — 1160F PR REVIEW ALL MEDS BY PRESCRIBER/CLIN PHARMACIST DOCUMENTED: ICD-10-PCS | Mod: CPTII,S$GLB,, | Performed by: INTERNAL MEDICINE

## 2022-05-26 PROCEDURE — 99214 OFFICE O/P EST MOD 30 MIN: CPT | Mod: S$GLB,,, | Performed by: INTERNAL MEDICINE

## 2022-05-26 PROCEDURE — 4010F PR ACE/ARB THEARPY RXD/TAKEN: ICD-10-PCS | Mod: CPTII,S$GLB,, | Performed by: INTERNAL MEDICINE

## 2022-05-26 PROCEDURE — 1159F MED LIST DOCD IN RCRD: CPT | Mod: CPTII,S$GLB,, | Performed by: INTERNAL MEDICINE

## 2022-05-26 PROCEDURE — 99214 PR OFFICE/OUTPT VISIT, EST, LEVL IV, 30-39 MIN: ICD-10-PCS | Mod: S$GLB,,, | Performed by: INTERNAL MEDICINE

## 2022-05-26 PROCEDURE — 1160F RVW MEDS BY RX/DR IN RCRD: CPT | Mod: CPTII,S$GLB,, | Performed by: INTERNAL MEDICINE

## 2022-05-26 PROCEDURE — 3078F PR MOST RECENT DIASTOLIC BLOOD PRESSURE < 80 MM HG: ICD-10-PCS | Mod: CPTII,S$GLB,, | Performed by: INTERNAL MEDICINE

## 2022-05-26 PROCEDURE — 99999 PR PBB SHADOW E&M-EST. PATIENT-LVL III: ICD-10-PCS | Mod: PBBFAC,,, | Performed by: INTERNAL MEDICINE

## 2022-05-26 PROCEDURE — 3075F SYST BP GE 130 - 139MM HG: CPT | Mod: CPTII,S$GLB,, | Performed by: INTERNAL MEDICINE

## 2022-05-26 PROCEDURE — 99999 PR PBB SHADOW E&M-EST. PATIENT-LVL III: CPT | Mod: PBBFAC,,, | Performed by: INTERNAL MEDICINE

## 2022-05-26 PROCEDURE — 3075F PR MOST RECENT SYSTOLIC BLOOD PRESS GE 130-139MM HG: ICD-10-PCS | Mod: CPTII,S$GLB,, | Performed by: INTERNAL MEDICINE

## 2022-05-26 RX ORDER — CARVEDILOL 6.25 MG/1
6.25 TABLET ORAL 2 TIMES DAILY
Status: ON HOLD | COMMUNITY
Start: 2022-04-28 | End: 2023-08-19 | Stop reason: SDUPTHER

## 2022-05-26 NOTE — PROGRESS NOTES
[facility]    Chief Complaint  Chief Complaint   Patient presents with    Peripheral Arterial Disease       HPI:    Doing well.  Started on carvedilol for blood pressure.  Tolerating it well.  No further chest discomfort.  No claudication.  No problem with nonhealing ulcers or infections.    Medications  Current Outpatient Medications   Medication Sig Dispense Refill    ascorbic acid, vitamin C, (VITAMIN C) 500 MG tablet Take 500 mg by mouth once daily.      aspirin (ECOTRIN) 81 MG EC tablet Take 81 mg by mouth once daily.      atorvastatin (LIPITOR) 20 MG tablet Take 20 mg by mouth.      blood sugar diagnostic Strp CHECK BLOOD SUGAR THREE TIMES A DAY      carvediloL (COREG) 6.25 MG tablet Take 6.25 mg by mouth 2 (two) times daily.      ergocalciferol, vitamin D2, (VITAMIN D ORAL) Take by mouth.      glipiZIDE (GLUCOTROL) 2.5 MG TR24 Take 2.5 mg by mouth.      olmesartan (BENICAR) 40 MG tablet Take 40 mg by mouth once daily.      omeprazole (PRILOSEC) 20 MG capsule Take 20 mg by mouth.      paroxetine (PAXIL) 10 MG tablet TAKE 1 TABLET BY MOUTH EVERY DAY       No current facility-administered medications for this visit.        History  Past Medical History:   Diagnosis Date    Anxiety     Atherosclerotic peripheral vascular disease     Diabetes mellitus, type 2     Gastroesophageal reflux disease     Hypercholesterolemia     Hypertension      Past Surgical History:   Procedure Laterality Date    APPENDECTOMY      BREAST BIOPSY Left     CATARACT EXTRACTION       SECTION      TUBAL LIGATION       Social History     Socioeconomic History    Marital status: Single   Tobacco Use    Smoking status: Never Smoker    Smokeless tobacco: Never Used   Substance and Sexual Activity    Alcohol use: No    Drug use: No    Sexual activity: Never     Family History   Problem Relation Age of Onset    Cancer Mother     Hypertension Mother     Diabetes Mother     Hypertension  Father     Diabetes Father     Heart disease Father     Diabetes Brother     Heart disease Brother     Heart attack Brother     Stroke Brother         Allergies  Review of patient's allergies indicates:   Allergen Reactions    Codeine Nausea And Vomiting    Pneumoc 13-frida conj-dip cr(pf) Other (See Comments) and Shortness Of Breath     Asthma    Pneumococcal 23-friad ps vaccine Other (See Comments) and Shortness Of Breath     Asthuma    Erythromycin     Mycinette [cetylpyridinium-benzocaine]     Penicillins     Sulfur     Tetracyclines     Influenza virus vaccines Hives and Rash       Review of Systems   Review of Systems   Constitutional: Negative for malaise/fatigue, weight gain and weight loss.   Eyes: Negative for visual disturbance.   Cardiovascular: Negative for chest pain, claudication, cyanosis, dyspnea on exertion, irregular heartbeat, leg swelling, near-syncope, orthopnea, palpitations, paroxysmal nocturnal dyspnea and syncope.   Respiratory: Negative for cough, hemoptysis, shortness of breath, sleep disturbances due to breathing and wheezing.    Hematologic/Lymphatic: Negative for bleeding problem. Does not bruise/bleed easily.   Skin: Negative for poor wound healing.   Musculoskeletal: Negative for muscle cramps and myalgias.   Gastrointestinal: Negative for abdominal pain, anorexia, diarrhea, heartburn, hematemesis, hematochezia, melena, nausea and vomiting.   Genitourinary: Negative for hematuria and nocturia.   Neurological: Negative for excessive daytime sleepiness, dizziness, focal weakness, light-headedness and weakness.       Physical Exam  Vitals:    05/26/22 1358   BP: 132/62   Pulse: 70     Wt Readings from Last 1 Encounters:   05/26/22 88.1 kg (194 lb 3.2 oz)     Physical Exam  Constitutional:       General: She is not in acute distress.     Appearance: She is not toxic-appearing or diaphoretic.   HENT:      Head: Normocephalic and atraumatic.   Eyes:      General: No scleral  icterus.     Conjunctiva/sclera: Conjunctivae normal.   Neck:      Thyroid: No thyromegaly.      Vascular: No carotid bruit, hepatojugular reflux or JVD.      Trachea: No tracheal deviation.   Cardiovascular:      Rate and Rhythm: Normal rate and regular rhythm.  No extrasystoles are present.     Chest Wall: PMI is not displaced.      Pulses:           Carotid pulses are 2+ on the right side and 2+ on the left side.       Radial pulses are 2+ on the right side and 2+ on the left side.        Dorsalis pedis pulses are 2+ on the right side and 2+ on the left side.        Posterior tibial pulses are 2+ on the right side and 2+ on the left side.      Heart sounds: S1 normal and S2 normal. No murmur heard.    No S3 or S4 sounds.   Pulmonary:      Effort: No accessory muscle usage or respiratory distress.      Breath sounds: No decreased breath sounds, wheezing, rhonchi or rales.   Abdominal:      General: Bowel sounds are normal. There is no abdominal bruit.      Palpations: Abdomen is soft. There is no hepatomegaly, splenomegaly or pulsatile mass.      Tenderness: There is no abdominal tenderness.   Musculoskeletal:         General: No tenderness or deformity.      Cervical back: Neck supple.   Skin:     General: Skin is warm and dry.      Coloration: Skin is not pale.      Nails: There is no clubbing.   Neurological:      Mental Status: She is alert and oriented to person, place, and time.      Cranial Nerves: No cranial nerve deficit.      Sensory: No sensory deficit.   Psychiatric:         Speech: Speech normal.         Behavior: Behavior normal. Behavior is cooperative.         Labs  Hospital Outpatient Visit on 12/16/2021   Component Date Value Ref Range Status    BSA 12/16/2021 2.01  m2 Final    Systolic blood pressure 12/16/2021 139  mmHg Final    Diastolic blood pressure 12/16/2021 69  mmHg Final    HR at rest 12/16/2021 83  bpm Final    RPP 12/16/2021 11,537   Final    Max Predicted HR 12/16/2021 141    Final    85% Max Predicted HR 12/16/2021 120   Final    OHS CV CPX PATIENT IS MALE 12/16/2021 0   Final    [facility] CV CPX PATIENT IS FEMALE 12/16/2021 1   Final    LVIDd 12/16/2021 5.40  3.5 - 6.0 cm Final    IVS 12/16/2021 1.00  0.6 - 1.1 cm Final    Posterior Wall 12/16/2021 1.00  0.6 - 1.1 cm Final    LVIDs 12/16/2021 3.90  2.1 - 4.0 cm Final    FS 12/16/2021 28  28 - 44 % Final    LV mass 12/16/2021 206.74  g Final    Left Ventricle Relative Wall Thick* 12/16/2021 0.37  cm Final    LV Mass Index 12/16/2021 106  g/m2 Final    Peak HR 12/16/2021 133  bpm Final    Peak Systolic BP 12/16/2021 245  mmHg Final    Peak Diatolic BP 12/16/2021 87  mmHg Final    Peak RPP 12/16/2021 32,585   Final    Estimated METs 12/16/2021 5   Final    % Max HR Achieved 12/16/2021 94   Final    Exercise duration (sec) 12/16/2021 1  seconds Final    Exercise duration (min) 12/16/2021 3  minutes Final       Imaging  No results found.    Assessment  1. Atherosclerotic peripheral vascular disease  Stable and non limiting    2. Primary hypertension  Controlled    3. Hypercholesterolemia  Controlled    4. Type 2 diabetes mellitus with diabetic peripheral angiopathy without gangrene, without long-term current use of insulin  Dr. Bales      Plan and Discussion    Continue current guideline directed medical therapy.    The 10-year ASCVD risk score (Hudson DC Jr., et al., 2013) is: 29%    Values used to calculate the score:      Age: 77 years      Sex: Female      Is Non- : Yes      Diabetic: Yes      Tobacco smoker: No      Systolic Blood Pressure: 132 mmHg      Is BP treated: Yes      HDL Cholesterol: 52 mg/dL      Total Cholesterol: 166 mg/dL     Follow Up  Follow up in about 1 year (around 5/26/2023).      Jh Mata MD

## 2023-05-31 ENCOUNTER — OFFICE VISIT (OUTPATIENT)
Dept: CARDIOLOGY | Facility: CLINIC | Age: 78
End: 2023-05-31
Payer: MEDICARE

## 2023-05-31 VITALS
WEIGHT: 197 LBS | OXYGEN SATURATION: 95 % | HEIGHT: 64 IN | BODY MASS INDEX: 33.63 KG/M2 | HEART RATE: 76 BPM | SYSTOLIC BLOOD PRESSURE: 136 MMHG | DIASTOLIC BLOOD PRESSURE: 63 MMHG

## 2023-05-31 DIAGNOSIS — E11.51 TYPE 2 DIABETES MELLITUS WITH DIABETIC PERIPHERAL ANGIOPATHY WITHOUT GANGRENE, WITHOUT LONG-TERM CURRENT USE OF INSULIN: ICD-10-CM

## 2023-05-31 DIAGNOSIS — I70.209 ATHEROSCLEROTIC PERIPHERAL VASCULAR DISEASE: Primary | ICD-10-CM

## 2023-05-31 DIAGNOSIS — E78.00 HYPERCHOLESTEROLEMIA: ICD-10-CM

## 2023-05-31 DIAGNOSIS — I10 PRIMARY HYPERTENSION: ICD-10-CM

## 2023-05-31 PROCEDURE — 3078F DIAST BP <80 MM HG: CPT | Mod: CPTII,S$GLB,, | Performed by: INTERNAL MEDICINE

## 2023-05-31 PROCEDURE — 1160F RVW MEDS BY RX/DR IN RCRD: CPT | Mod: CPTII,S$GLB,, | Performed by: INTERNAL MEDICINE

## 2023-05-31 PROCEDURE — 1159F PR MEDICATION LIST DOCUMENTED IN MEDICAL RECORD: ICD-10-PCS | Mod: CPTII,S$GLB,, | Performed by: INTERNAL MEDICINE

## 2023-05-31 PROCEDURE — 1125F PR PAIN SEVERITY QUANTIFIED, PAIN PRESENT: ICD-10-PCS | Mod: CPTII,S$GLB,, | Performed by: INTERNAL MEDICINE

## 2023-05-31 PROCEDURE — 99999 PR PBB SHADOW E&M-EST. PATIENT-LVL IV: CPT | Mod: PBBFAC,,, | Performed by: INTERNAL MEDICINE

## 2023-05-31 PROCEDURE — 3075F PR MOST RECENT SYSTOLIC BLOOD PRESS GE 130-139MM HG: ICD-10-PCS | Mod: CPTII,S$GLB,, | Performed by: INTERNAL MEDICINE

## 2023-05-31 PROCEDURE — 3078F PR MOST RECENT DIASTOLIC BLOOD PRESSURE < 80 MM HG: ICD-10-PCS | Mod: CPTII,S$GLB,, | Performed by: INTERNAL MEDICINE

## 2023-05-31 PROCEDURE — 99999 PR PBB SHADOW E&M-EST. PATIENT-LVL IV: ICD-10-PCS | Mod: PBBFAC,,, | Performed by: INTERNAL MEDICINE

## 2023-05-31 PROCEDURE — 1101F PT FALLS ASSESS-DOCD LE1/YR: CPT | Mod: CPTII,S$GLB,, | Performed by: INTERNAL MEDICINE

## 2023-05-31 PROCEDURE — 1160F PR REVIEW ALL MEDS BY PRESCRIBER/CLIN PHARMACIST DOCUMENTED: ICD-10-PCS | Mod: CPTII,S$GLB,, | Performed by: INTERNAL MEDICINE

## 2023-05-31 PROCEDURE — 3075F SYST BP GE 130 - 139MM HG: CPT | Mod: CPTII,S$GLB,, | Performed by: INTERNAL MEDICINE

## 2023-05-31 PROCEDURE — 1101F PR PT FALLS ASSESS DOC 0-1 FALLS W/OUT INJ PAST YR: ICD-10-PCS | Mod: CPTII,S$GLB,, | Performed by: INTERNAL MEDICINE

## 2023-05-31 PROCEDURE — 1159F MED LIST DOCD IN RCRD: CPT | Mod: CPTII,S$GLB,, | Performed by: INTERNAL MEDICINE

## 2023-05-31 PROCEDURE — 1125F AMNT PAIN NOTED PAIN PRSNT: CPT | Mod: CPTII,S$GLB,, | Performed by: INTERNAL MEDICINE

## 2023-05-31 PROCEDURE — 99214 OFFICE O/P EST MOD 30 MIN: CPT | Mod: S$GLB,,, | Performed by: INTERNAL MEDICINE

## 2023-05-31 PROCEDURE — 99214 PR OFFICE/OUTPT VISIT, EST, LEVL IV, 30-39 MIN: ICD-10-PCS | Mod: S$GLB,,, | Performed by: INTERNAL MEDICINE

## 2023-05-31 PROCEDURE — 3288F FALL RISK ASSESSMENT DOCD: CPT | Mod: CPTII,S$GLB,, | Performed by: INTERNAL MEDICINE

## 2023-05-31 PROCEDURE — 3288F PR FALLS RISK ASSESSMENT DOCUMENTED: ICD-10-PCS | Mod: CPTII,S$GLB,, | Performed by: INTERNAL MEDICINE

## 2023-05-31 NOTE — PROGRESS NOTES
[facility]    Chief Complaint  Chief Complaint   Patient presents with    Peripheral Arterial Disease       HPI:    She has been doing well and is without complaints.  Not exercising very much.  But was what she does, no claudication.  No exertional dyspnea or chest discomfort.  Recent checkup with Dr. Beckwith went well.    Medications  Current Outpatient Medications   Medication Sig Dispense Refill    ascorbic acid, vitamin C, (VITAMIN C) 500 MG tablet Take 500 mg by mouth once daily.      aspirin (ECOTRIN) 81 MG EC tablet Take 81 mg by mouth once daily.      atorvastatin (LIPITOR) 20 MG tablet Take 20 mg by mouth.      blood sugar diagnostic Strp CHECK BLOOD SUGAR THREE TIMES A DAY      carvediloL (COREG) 6.25 MG tablet Take 6.25 mg by mouth 2 (two) times daily.      ergocalciferol, vitamin D2, (VITAMIN D ORAL) Take by mouth.      glipiZIDE (GLUCOTROL) 2.5 MG TR24 Take 2.5 mg by mouth.      olmesartan (BENICAR) 40 MG tablet Take 40 mg by mouth once daily.      omeprazole (PRILOSEC) 20 MG capsule Take 20 mg by mouth.      paroxetine (PAXIL) 10 MG tablet TAKE 1 TABLET BY MOUTH EVERY DAY       No current facility-administered medications for this visit.        History  Past Medical History:   Diagnosis Date    Anxiety     Atherosclerotic peripheral vascular disease     Diabetes mellitus, type 2     Gastroesophageal reflux disease     Hypercholesterolemia     Hypertension      Past Surgical History:   Procedure Laterality Date    APPENDECTOMY      BREAST BIOPSY Left     CATARACT EXTRACTION       SECTION      TUBAL LIGATION       Social History     Socioeconomic History    Marital status: Single   Tobacco Use    Smoking status: Never    Smokeless tobacco: Never   Substance and Sexual Activity    Alcohol use: No    Drug use: No    Sexual activity: Never     Family History   Problem Relation Age of Onset    Cancer Mother     Hypertension Mother     Diabetes Mother     Hypertension Father      Diabetes Father     Heart disease Father     Diabetes Brother     Heart disease Brother     Heart attack Brother     Stroke Brother         Allergies  Review of patient's allergies indicates:   Allergen Reactions    Codeine Nausea And Vomiting    Pneumoc 13-frida conj-dip cr(pf) Other (See Comments) and Shortness Of Breath     Asthma    Pneumococcal 23-frida ps vaccine Other (See Comments) and Shortness Of Breath     Asthuma    Erythromycin     Mycinette [cetylpyridinium-benzocaine]     Penicillins     Sulfur     Tetracyclines     Influenza virus vaccines Hives and Rash       Review of Systems   Review of Systems   Constitutional: Negative for malaise/fatigue, weight gain and weight loss.   Eyes:  Negative for visual disturbance.   Cardiovascular:  Negative for chest pain, claudication, cyanosis, dyspnea on exertion, irregular heartbeat, leg swelling, near-syncope, orthopnea, palpitations, paroxysmal nocturnal dyspnea and syncope.   Respiratory:  Negative for cough, hemoptysis, shortness of breath, sleep disturbances due to breathing and wheezing.    Hematologic/Lymphatic: Negative for bleeding problem. Does not bruise/bleed easily.   Skin:  Negative for poor wound healing.   Musculoskeletal:  Negative for muscle cramps and myalgias.   Gastrointestinal:  Negative for abdominal pain, anorexia, diarrhea, heartburn, hematemesis, hematochezia, melena, nausea and vomiting.   Genitourinary:  Negative for hematuria and nocturia.   Neurological:  Negative for excessive daytime sleepiness, dizziness, focal weakness, light-headedness and weakness.     Physical Exam  Vitals:    05/31/23 1332   BP: 136/63   Pulse: 76     Wt Readings from Last 1 Encounters:   05/31/23 89.4 kg (197 lb)     Physical Exam  Constitutional:       General: She is not in acute distress.     Appearance: She is not toxic-appearing or diaphoretic.   HENT:      Head: Normocephalic and atraumatic.   Eyes:      General: No scleral icterus.      Conjunctiva/sclera: Conjunctivae normal.   Neck:      Thyroid: No thyromegaly.      Vascular: No carotid bruit, hepatojugular reflux or JVD.      Trachea: No tracheal deviation.   Cardiovascular:      Rate and Rhythm: Normal rate and regular rhythm. No extrasystoles are present.     Chest Wall: PMI is not displaced.      Pulses:           Carotid pulses are 2+ on the right side and 2+ on the left side.       Radial pulses are 2+ on the right side and 2+ on the left side.        Dorsalis pedis pulses are 2+ on the right side and 2+ on the left side.        Posterior tibial pulses are 2+ on the right side and 2+ on the left side.      Heart sounds: S1 normal and S2 normal. No murmur heard.    No S3 or S4 sounds.   Pulmonary:      Effort: No accessory muscle usage or respiratory distress.      Breath sounds: No decreased breath sounds, wheezing, rhonchi or rales.   Abdominal:      General: Bowel sounds are normal. There is no abdominal bruit.      Palpations: Abdomen is soft. There is no hepatomegaly, splenomegaly or pulsatile mass.      Tenderness: There is no abdominal tenderness.   Musculoskeletal:         General: No tenderness or deformity.      Cervical back: Neck supple.   Skin:     General: Skin is warm and dry.      Coloration: Skin is not pale.      Nails: There is no clubbing.   Neurological:      Mental Status: She is alert and oriented to person, place, and time.      Cranial Nerves: No cranial nerve deficit.      Sensory: No sensory deficit.   Psychiatric:         Speech: Speech normal.         Behavior: Behavior normal. Behavior is cooperative.       Labs  No visits with results within 6 Month(s) from this visit.   Latest known visit with results is:   Hospital Outpatient Visit on 12/16/2021   Component Date Value Ref Range Status    BSA 12/16/2021 2.01  m2 Final    Systolic blood pressure 12/16/2021 139  mmHg Final    Diastolic blood pressure 12/16/2021 69  mmHg Final    HR at rest 12/16/2021 83  bpm  Final    RPP 12/16/2021 11,537   Final    Max Predicted HR 12/16/2021 141   Final    85% Max Predicted HR 12/16/2021 120   Final    [facility] CV CPX PATIENT IS MALE 12/16/2021 0   Final    [facility] CV CPX PATIENT IS FEMALE 12/16/2021 1   Final    LVIDd 12/16/2021 5.40  3.5 - 6.0 cm Final    IVS 12/16/2021 1.00  0.6 - 1.1 cm Final    Posterior Wall 12/16/2021 1.00  0.6 - 1.1 cm Final    LVIDs 12/16/2021 3.90  2.1 - 4.0 cm Final    FS 12/16/2021 28  28 - 44 % Final    LV mass 12/16/2021 206.74  g Final    Left Ventricle Relative Wall Thick* 12/16/2021 0.37  cm Final    LV Mass Index 12/16/2021 106  g/m2 Final    Peak HR 12/16/2021 133  bpm Final    Peak Systolic BP 12/16/2021 245  mmHg Final    Peak Diatolic BP 12/16/2021 87  mmHg Final    Peak RPP 12/16/2021 32,585   Final    Estimated METs 12/16/2021 5   Final    % Max HR Achieved 12/16/2021 94   Final    Exercise duration (sec) 12/16/2021 1  seconds Final    Exercise duration (min) 12/16/2021 3  minutes Final       Imaging  No results found.    Assessment  1. Atherosclerotic peripheral vascular disease  Asymptomatic    2. Primary hypertension  Controlled    3. Hypercholesterolemia  Controlled    4. Type 2 diabetes mellitus with diabetic peripheral angiopathy without gangrene, without long-term current use of insulin  Controlled      Plan and Discussion    Continue current guideline directed medical therapy.    The 10-year ASCVD risk score (Enid DK, et al., 2019) is: 31.2%    Values used to calculate the score:      Age: 78 years      Sex: Female      Is Non- : Yes      Diabetic: Yes      Tobacco smoker: No      Systolic Blood Pressure: 136 mmHg      Is BP treated: Yes      HDL Cholesterol: 52 mg/dL      Total Cholesterol: 166 mg/dL     Follow Up  Follow up in about 1 year (around 5/31/2024).      Jh Mata MD

## 2023-08-17 ENCOUNTER — HOSPITAL ENCOUNTER (INPATIENT)
Facility: HOSPITAL | Age: 78
LOS: 2 days | Discharge: HOME | DRG: 282 | End: 2023-08-19
Attending: EMERGENCY MEDICINE | Admitting: HOSPITALIST
Payer: MEDICARE

## 2023-08-17 DIAGNOSIS — I16.1 HYPERTENSIVE EMERGENCY: ICD-10-CM

## 2023-08-17 DIAGNOSIS — E11.22 TYPE 2 DIABETES MELLITUS WITH STAGE 1 CHRONIC KIDNEY DISEASE, WITHOUT LONG-TERM CURRENT USE OF INSULIN: ICD-10-CM

## 2023-08-17 DIAGNOSIS — R55 PRE-SYNCOPE: ICD-10-CM

## 2023-08-17 DIAGNOSIS — I10 ESSENTIAL HYPERTENSION: ICD-10-CM

## 2023-08-17 DIAGNOSIS — R79.89 ELEVATED TROPONIN: ICD-10-CM

## 2023-08-17 DIAGNOSIS — N18.1 TYPE 2 DIABETES MELLITUS WITH STAGE 1 CHRONIC KIDNEY DISEASE, WITHOUT LONG-TERM CURRENT USE OF INSULIN: ICD-10-CM

## 2023-08-17 DIAGNOSIS — R19.7 DIARRHEA, UNSPECIFIED TYPE: ICD-10-CM

## 2023-08-17 DIAGNOSIS — I16.1 HYPERTENSIVE EMERGENCY, NO CHF: ICD-10-CM

## 2023-08-17 DIAGNOSIS — I21.4 NSTEMI (NON-ST ELEVATED MYOCARDIAL INFARCTION): Primary | ICD-10-CM

## 2023-08-17 LAB
ALBUMIN SERPL BCP-MCNC: 3.6 G/DL (ref 3.5–5.2)
ALP SERPL-CCNC: 59 U/L (ref 55–135)
ALT SERPL W/O P-5'-P-CCNC: 17 U/L (ref 10–44)
ANION GAP SERPL CALC-SCNC: 11 MMOL/L (ref 8–16)
AST SERPL-CCNC: 22 U/L (ref 10–40)
BASOPHILS # BLD AUTO: 0.05 K/UL (ref 0–0.2)
BASOPHILS NFR BLD: 0.6 % (ref 0–1.9)
BILIRUB SERPL-MCNC: 0.5 MG/DL (ref 0.1–1)
BNP SERPL-MCNC: 65 PG/ML (ref 0–99)
BUN SERPL-MCNC: 9 MG/DL (ref 8–23)
CALCIUM SERPL-MCNC: 9 MG/DL (ref 8.7–10.5)
CHLORIDE SERPL-SCNC: 106 MMOL/L (ref 95–110)
CO2 SERPL-SCNC: 23 MMOL/L (ref 23–29)
CREAT SERPL-MCNC: 0.8 MG/DL (ref 0.5–1.4)
DIFFERENTIAL METHOD: ABNORMAL
EOSINOPHIL # BLD AUTO: 0.1 K/UL (ref 0–0.5)
EOSINOPHIL NFR BLD: 0.8 % (ref 0–8)
ERYTHROCYTE [DISTWIDTH] IN BLOOD BY AUTOMATED COUNT: 13.8 % (ref 11.5–14.5)
EST. GFR  (NO RACE VARIABLE): >60 ML/MIN/1.73 M^2
GLUCOSE SERPL-MCNC: 137 MG/DL (ref 70–110)
HCT VFR BLD AUTO: 41.4 % (ref 37–48.5)
HCV AB SERPL QL IA: NORMAL
HGB BLD-MCNC: 13.4 G/DL (ref 12–16)
HIV 1+2 AB+HIV1 P24 AG SERPL QL IA: NORMAL
IMM GRANULOCYTES # BLD AUTO: 0.04 K/UL (ref 0–0.04)
IMM GRANULOCYTES NFR BLD AUTO: 0.4 % (ref 0–0.5)
LYMPHOCYTES # BLD AUTO: 2 K/UL (ref 1–4.8)
LYMPHOCYTES NFR BLD: 21.8 % (ref 18–48)
MAGNESIUM SERPL-MCNC: 1.9 MG/DL (ref 1.6–2.6)
MCH RBC QN AUTO: 28 PG (ref 27–31)
MCHC RBC AUTO-ENTMCNC: 32.4 G/DL (ref 32–36)
MCV RBC AUTO: 87 FL (ref 82–98)
MONOCYTES # BLD AUTO: 0.3 K/UL (ref 0.3–1)
MONOCYTES NFR BLD: 3.2 % (ref 4–15)
NEUTROPHILS # BLD AUTO: 6.6 K/UL (ref 1.8–7.7)
NEUTROPHILS NFR BLD: 73.2 % (ref 38–73)
NRBC BLD-RTO: 0 /100 WBC
PHOSPHATE SERPL-MCNC: 2.8 MG/DL (ref 2.7–4.5)
PLATELET # BLD AUTO: 207 K/UL (ref 150–450)
PMV BLD AUTO: 12.1 FL (ref 9.2–12.9)
POTASSIUM SERPL-SCNC: 4 MMOL/L (ref 3.5–5.1)
PROT SERPL-MCNC: 7 G/DL (ref 6–8.4)
RBC # BLD AUTO: 4.78 M/UL (ref 4–5.4)
SODIUM SERPL-SCNC: 140 MMOL/L (ref 136–145)
TROPONIN I SERPL DL<=0.01 NG/ML-MCNC: 0.31 NG/ML (ref 0–0.03)
TROPONIN I SERPL DL<=0.01 NG/ML-MCNC: 0.45 NG/ML (ref 0–0.03)
WBC # BLD AUTO: 9.05 K/UL (ref 3.9–12.7)

## 2023-08-17 PROCEDURE — 99285 EMERGENCY DEPT VISIT HI MDM: CPT | Mod: 25

## 2023-08-17 PROCEDURE — 87389 HIV-1 AG W/HIV-1&-2 AB AG IA: CPT | Performed by: PHYSICIAN ASSISTANT

## 2023-08-17 PROCEDURE — 93010 EKG 12-LEAD: ICD-10-PCS | Mod: 76,,, | Performed by: INTERNAL MEDICINE

## 2023-08-17 PROCEDURE — 12000002 HC ACUTE/MED SURGE SEMI-PRIVATE ROOM

## 2023-08-17 PROCEDURE — 83735 ASSAY OF MAGNESIUM: CPT | Performed by: PHYSICIAN ASSISTANT

## 2023-08-17 PROCEDURE — 93010 ELECTROCARDIOGRAM REPORT: CPT | Mod: ,,, | Performed by: INTERNAL MEDICINE

## 2023-08-17 PROCEDURE — 86803 HEPATITIS C AB TEST: CPT | Performed by: PHYSICIAN ASSISTANT

## 2023-08-17 PROCEDURE — 25000003 PHARM REV CODE 250: Performed by: EMERGENCY MEDICINE

## 2023-08-17 PROCEDURE — 84484 ASSAY OF TROPONIN QUANT: CPT | Performed by: PHYSICIAN ASSISTANT

## 2023-08-17 PROCEDURE — 84100 ASSAY OF PHOSPHORUS: CPT | Performed by: PHYSICIAN ASSISTANT

## 2023-08-17 PROCEDURE — 93005 ELECTROCARDIOGRAM TRACING: CPT

## 2023-08-17 PROCEDURE — 80053 COMPREHEN METABOLIC PANEL: CPT | Performed by: PHYSICIAN ASSISTANT

## 2023-08-17 PROCEDURE — 25000003 PHARM REV CODE 250: Performed by: PHYSICIAN ASSISTANT

## 2023-08-17 PROCEDURE — 82962 GLUCOSE BLOOD TEST: CPT

## 2023-08-17 PROCEDURE — 85025 COMPLETE CBC W/AUTO DIFF WBC: CPT | Performed by: PHYSICIAN ASSISTANT

## 2023-08-17 PROCEDURE — 84484 ASSAY OF TROPONIN QUANT: CPT | Mod: 91 | Performed by: EMERGENCY MEDICINE

## 2023-08-17 PROCEDURE — 96360 HYDRATION IV INFUSION INIT: CPT

## 2023-08-17 PROCEDURE — 96361 HYDRATE IV INFUSION ADD-ON: CPT

## 2023-08-17 PROCEDURE — 83880 ASSAY OF NATRIURETIC PEPTIDE: CPT | Performed by: PHYSICIAN ASSISTANT

## 2023-08-17 PROCEDURE — 93010 ELECTROCARDIOGRAM REPORT: CPT | Mod: 76,,, | Performed by: INTERNAL MEDICINE

## 2023-08-17 RX ORDER — ATORVASTATIN CALCIUM 20 MG/1
80 TABLET, FILM COATED ORAL DAILY
Status: DISCONTINUED | OUTPATIENT
Start: 2023-08-18 | End: 2023-08-19 | Stop reason: HOSPADM

## 2023-08-17 RX ORDER — PAROXETINE 10 MG/1
10 TABLET, FILM COATED ORAL NIGHTLY
Status: DISCONTINUED | OUTPATIENT
Start: 2023-08-17 | End: 2023-08-19 | Stop reason: HOSPADM

## 2023-08-17 RX ORDER — CLOPIDOGREL 300 MG/1
300 TABLET, FILM COATED ORAL ONCE
Status: COMPLETED | OUTPATIENT
Start: 2023-08-18 | End: 2023-08-18

## 2023-08-17 RX ORDER — ASPIRIN 325 MG
325 TABLET ORAL
Status: COMPLETED | OUTPATIENT
Start: 2023-08-17 | End: 2023-08-17

## 2023-08-17 RX ORDER — ACETAMINOPHEN 500 MG
1000 TABLET ORAL
Status: COMPLETED | OUTPATIENT
Start: 2023-08-17 | End: 2023-08-17

## 2023-08-17 RX ORDER — VALSARTAN 160 MG/1
320 TABLET ORAL DAILY
Status: DISCONTINUED | OUTPATIENT
Start: 2023-08-18 | End: 2023-08-19 | Stop reason: HOSPADM

## 2023-08-17 RX ORDER — GLUCAGON 1 MG
1 KIT INJECTION
Status: DISCONTINUED | OUTPATIENT
Start: 2023-08-18 | End: 2023-08-19 | Stop reason: HOSPADM

## 2023-08-17 RX ORDER — INSULIN ASPART 100 [IU]/ML
0-5 INJECTION, SOLUTION INTRAVENOUS; SUBCUTANEOUS
Status: DISCONTINUED | OUTPATIENT
Start: 2023-08-18 | End: 2023-08-19 | Stop reason: HOSPADM

## 2023-08-17 RX ORDER — LABETALOL HCL 20 MG/4 ML
20 SYRINGE (ML) INTRAVENOUS EVERY 4 HOURS PRN
Status: DISCONTINUED | OUTPATIENT
Start: 2023-08-17 | End: 2023-08-19 | Stop reason: HOSPADM

## 2023-08-17 RX ORDER — PAROXETINE 10 MG/1
10 TABLET, FILM COATED ORAL DAILY
Status: DISCONTINUED | OUTPATIENT
Start: 2023-08-18 | End: 2023-08-17

## 2023-08-17 RX ORDER — IBUPROFEN 200 MG
24 TABLET ORAL
Status: DISCONTINUED | OUTPATIENT
Start: 2023-08-18 | End: 2023-08-19 | Stop reason: HOSPADM

## 2023-08-17 RX ORDER — SODIUM CHLORIDE 0.9 % (FLUSH) 0.9 %
10 SYRINGE (ML) INJECTION EVERY 12 HOURS PRN
Status: DISCONTINUED | OUTPATIENT
Start: 2023-08-18 | End: 2023-08-19 | Stop reason: HOSPADM

## 2023-08-17 RX ORDER — IBUPROFEN 200 MG
16 TABLET ORAL
Status: DISCONTINUED | OUTPATIENT
Start: 2023-08-18 | End: 2023-08-19 | Stop reason: HOSPADM

## 2023-08-17 RX ORDER — LABETALOL HCL 20 MG/4 ML
10 SYRINGE (ML) INTRAVENOUS ONCE
Status: DISCONTINUED | OUTPATIENT
Start: 2023-08-17 | End: 2023-08-18

## 2023-08-17 RX ORDER — NALOXONE HCL 0.4 MG/ML
0.02 VIAL (ML) INJECTION
Status: DISCONTINUED | OUTPATIENT
Start: 2023-08-18 | End: 2023-08-19 | Stop reason: HOSPADM

## 2023-08-17 RX ORDER — ASPIRIN 81 MG/1
81 TABLET ORAL DAILY
Status: DISCONTINUED | OUTPATIENT
Start: 2023-08-18 | End: 2023-08-19 | Stop reason: HOSPADM

## 2023-08-17 RX ORDER — HEPARIN SODIUM,PORCINE/D5W 25000/250
0-40 INTRAVENOUS SOLUTION INTRAVENOUS CONTINUOUS
Status: DISCONTINUED | OUTPATIENT
Start: 2023-08-18 | End: 2023-08-18

## 2023-08-17 RX ORDER — CARVEDILOL 6.25 MG/1
6.25 TABLET ORAL 2 TIMES DAILY
Status: DISCONTINUED | OUTPATIENT
Start: 2023-08-18 | End: 2023-08-19

## 2023-08-17 RX ADMIN — ACETAMINOPHEN 1000 MG: 500 TABLET ORAL at 10:08

## 2023-08-17 RX ADMIN — SODIUM CHLORIDE 1000 ML: 9 INJECTION, SOLUTION INTRAVENOUS at 09:08

## 2023-08-17 RX ADMIN — ASPIRIN 325 MG ORAL TABLET 325 MG: 325 PILL ORAL at 10:08

## 2023-08-18 LAB
ALBUMIN SERPL BCP-MCNC: 3.3 G/DL (ref 3.5–5.2)
ALP SERPL-CCNC: 57 U/L (ref 55–135)
ALT SERPL W/O P-5'-P-CCNC: 13 U/L (ref 10–44)
ANION GAP SERPL CALC-SCNC: 7 MMOL/L (ref 8–16)
APTT PPP: 105.1 SEC (ref 21–32)
APTT PPP: 30 SEC (ref 21–32)
APTT PPP: 33 SEC (ref 21–32)
APTT PPP: 76.3 SEC (ref 21–32)
ASCENDING AORTA: 3.24 CM
AST SERPL-CCNC: 19 U/L (ref 10–40)
AV INDEX (PROSTH): 0.58
AV MEAN GRADIENT: 3 MMHG
AV PEAK GRADIENT: 5 MMHG
AV VALVE AREA BY VELOCITY RATIO: 2.05 CM²
AV VALVE AREA: 2.09 CM²
AV VELOCITY RATIO: 0.56
BASOPHILS # BLD AUTO: 0.04 K/UL (ref 0–0.2)
BASOPHILS # BLD AUTO: 0.04 K/UL (ref 0–0.2)
BASOPHILS NFR BLD: 0.5 % (ref 0–1.9)
BASOPHILS NFR BLD: 0.5 % (ref 0–1.9)
BILIRUB SERPL-MCNC: 0.4 MG/DL (ref 0.1–1)
BSA FOR ECHO PROCEDURE: 2 M2
BUN SERPL-MCNC: 8 MG/DL (ref 8–23)
CALCIUM SERPL-MCNC: 8.6 MG/DL (ref 8.7–10.5)
CHLORIDE SERPL-SCNC: 108 MMOL/L (ref 95–110)
CO2 SERPL-SCNC: 25 MMOL/L (ref 23–29)
CREAT SERPL-MCNC: 0.8 MG/DL (ref 0.5–1.4)
CV ECHO LV RWT: 0.34 CM
DIFFERENTIAL METHOD: NORMAL
DIFFERENTIAL METHOD: NORMAL
DOP CALC AO PEAK VEL: 1.17 M/S
DOP CALC AO VTI: 32.31 CM
DOP CALC LVOT AREA: 3.6 CM2
DOP CALC LVOT DIAMETER: 2.15 CM
DOP CALC LVOT PEAK VEL: 0.66 M/S
DOP CALC LVOT STROKE VOLUME: 67.57 CM3
DOP CALCLVOT PEAK VEL VTI: 18.62 CM
E WAVE DECELERATION TIME: 228.37 MSEC
E/A RATIO: 0.88
E/E' RATIO: 9.85 M/S
ECHO LV POSTERIOR WALL: 0.94 CM (ref 0.6–1.1)
EOSINOPHIL # BLD AUTO: 0 K/UL (ref 0–0.5)
EOSINOPHIL # BLD AUTO: 0.1 K/UL (ref 0–0.5)
EOSINOPHIL NFR BLD: 0.5 % (ref 0–8)
EOSINOPHIL NFR BLD: 1 % (ref 0–8)
ERYTHROCYTE [DISTWIDTH] IN BLOOD BY AUTOMATED COUNT: 13.7 % (ref 11.5–14.5)
ERYTHROCYTE [DISTWIDTH] IN BLOOD BY AUTOMATED COUNT: 13.9 % (ref 11.5–14.5)
EST. GFR  (NO RACE VARIABLE): >60 ML/MIN/1.73 M^2
ESTIMATED AVG GLUCOSE: 154 MG/DL (ref 68–131)
FRACTIONAL SHORTENING: 25 % (ref 28–44)
GLUCOSE SERPL-MCNC: 108 MG/DL (ref 70–110)
GLUCOSE SERPL-MCNC: 124 MG/DL (ref 70–110)
HBA1C MFR BLD: 7 % (ref 4–5.6)
HCT VFR BLD AUTO: 37.2 % (ref 37–48.5)
HCT VFR BLD AUTO: 38 % (ref 37–48.5)
HGB BLD-MCNC: 12.3 G/DL (ref 12–16)
HGB BLD-MCNC: 12.4 G/DL (ref 12–16)
IMM GRANULOCYTES # BLD AUTO: 0.01 K/UL (ref 0–0.04)
IMM GRANULOCYTES # BLD AUTO: 0.02 K/UL (ref 0–0.04)
IMM GRANULOCYTES NFR BLD AUTO: 0.1 % (ref 0–0.5)
IMM GRANULOCYTES NFR BLD AUTO: 0.3 % (ref 0–0.5)
INR PPP: 1 (ref 0.8–1.2)
INTERVENTRICULAR SEPTUM: 1.08 CM (ref 0.6–1.1)
LA MAJOR: 5.63 CM
LA MINOR: 5.63 CM
LA WIDTH: 4.22 CM
LEFT ATRIUM SIZE: 4.74 CM
LEFT ATRIUM VOLUME INDEX MOD: 43.3 ML/M2
LEFT ATRIUM VOLUME INDEX: 49.3 ML/M2
LEFT ATRIUM VOLUME MOD: 83.94 CM3
LEFT ATRIUM VOLUME: 95.72 CM3
LEFT INTERNAL DIMENSION IN SYSTOLE: 4.17 CM (ref 2.1–4)
LEFT VENTRICLE DIASTOLIC VOLUME INDEX: 78.45 ML/M2
LEFT VENTRICLE DIASTOLIC VOLUME: 152.2 ML
LEFT VENTRICLE MASS INDEX: 114 G/M2
LEFT VENTRICLE SYSTOLIC VOLUME INDEX: 39.9 ML/M2
LEFT VENTRICLE SYSTOLIC VOLUME: 77.32 ML
LEFT VENTRICULAR INTERNAL DIMENSION IN DIASTOLE: 5.58 CM (ref 3.5–6)
LEFT VENTRICULAR MASS: 221.28 G
LV LATERAL E/E' RATIO: 9.14 M/S
LV SEPTAL E/E' RATIO: 10.67 M/S
LYMPHOCYTES # BLD AUTO: 2.5 K/UL (ref 1–4.8)
LYMPHOCYTES # BLD AUTO: 3.5 K/UL (ref 1–4.8)
LYMPHOCYTES NFR BLD: 31.9 % (ref 18–48)
LYMPHOCYTES NFR BLD: 43.7 % (ref 18–48)
MAGNESIUM SERPL-MCNC: 1.9 MG/DL (ref 1.6–2.6)
MCH RBC QN AUTO: 27.8 PG (ref 27–31)
MCH RBC QN AUTO: 28.6 PG (ref 27–31)
MCHC RBC AUTO-ENTMCNC: 32.4 G/DL (ref 32–36)
MCHC RBC AUTO-ENTMCNC: 33.3 G/DL (ref 32–36)
MCV RBC AUTO: 86 FL (ref 82–98)
MCV RBC AUTO: 86 FL (ref 82–98)
MONOCYTES # BLD AUTO: 0.3 K/UL (ref 0.3–1)
MONOCYTES # BLD AUTO: 0.5 K/UL (ref 0.3–1)
MONOCYTES NFR BLD: 4 % (ref 4–15)
MONOCYTES NFR BLD: 6 % (ref 4–15)
MV A" WAVE DURATION": 9.9 MSEC
MV PEAK A VEL: 0.73 M/S
MV PEAK E VEL: 0.64 M/S
NEUTROPHILS # BLD AUTO: 3.9 K/UL (ref 1.8–7.7)
NEUTROPHILS # BLD AUTO: 4.9 K/UL (ref 1.8–7.7)
NEUTROPHILS NFR BLD: 48.5 % (ref 38–73)
NEUTROPHILS NFR BLD: 63 % (ref 38–73)
NRBC BLD-RTO: 0 /100 WBC
NRBC BLD-RTO: 0 /100 WBC
PHOSPHATE SERPL-MCNC: 3.2 MG/DL (ref 2.7–4.5)
PISA TR MAX VEL: 2.48 M/S
PLATELET # BLD AUTO: 192 K/UL (ref 150–450)
PLATELET # BLD AUTO: 196 K/UL (ref 150–450)
PMV BLD AUTO: 11.9 FL (ref 9.2–12.9)
PMV BLD AUTO: 12.2 FL (ref 9.2–12.9)
POCT GLUCOSE: 106 MG/DL (ref 70–110)
POCT GLUCOSE: 124 MG/DL (ref 70–110)
POCT GLUCOSE: 142 MG/DL (ref 70–110)
POCT GLUCOSE: 99 MG/DL (ref 70–110)
POTASSIUM SERPL-SCNC: 3.6 MMOL/L (ref 3.5–5.1)
PROT SERPL-MCNC: 6.3 G/DL (ref 6–8.4)
PROTHROMBIN TIME: 10.6 SEC (ref 9–12.5)
PULM VEIN S/D RATIO: 1.69
PV PEAK D VEL: 0.35 M/S
PV PEAK S VEL: 0.59 M/S
RA MAJOR: 4.8 CM
RA PRESSURE ESTIMATED: 8 MMHG
RA WIDTH: 3.21 CM
RBC # BLD AUTO: 4.34 M/UL (ref 4–5.4)
RBC # BLD AUTO: 4.42 M/UL (ref 4–5.4)
RIGHT VENTRICULAR END-DIASTOLIC DIMENSION: 3.31 CM
RV TB RVSP: 10 MMHG
SINUS: 3.21 CM
SODIUM SERPL-SCNC: 140 MMOL/L (ref 136–145)
STJ: 2.6 CM
TDI LATERAL: 0.07 M/S
TDI SEPTAL: 0.06 M/S
TDI: 0.07 M/S
TR MAX PG: 25 MMHG
TRICUSPID ANNULAR PLANE SYSTOLIC EXCURSION: 2.14 CM
TROPONIN I SERPL DL<=0.01 NG/ML-MCNC: 0.48 NG/ML (ref 0–0.03)
TROPONIN I SERPL DL<=0.01 NG/ML-MCNC: 0.53 NG/ML (ref 0–0.03)
TV REST PULMONARY ARTERY PRESSURE: 33 MMHG
WBC # BLD AUTO: 7.71 K/UL (ref 3.9–12.7)
WBC # BLD AUTO: 7.96 K/UL (ref 3.9–12.7)
Z-SCORE OF LEFT VENTRICULAR DIMENSION IN END DIASTOLE: 0.12
Z-SCORE OF LEFT VENTRICULAR DIMENSION IN END SYSTOLE: 1.63

## 2023-08-18 PROCEDURE — 63600175 PHARM REV CODE 636 W HCPCS: Performed by: STUDENT IN AN ORGANIZED HEALTH CARE EDUCATION/TRAINING PROGRAM

## 2023-08-18 PROCEDURE — 36415 COLL VENOUS BLD VENIPUNCTURE: CPT | Performed by: STUDENT IN AN ORGANIZED HEALTH CARE EDUCATION/TRAINING PROGRAM

## 2023-08-18 PROCEDURE — 99223 1ST HOSP IP/OBS HIGH 75: CPT | Mod: ,,, | Performed by: STUDENT IN AN ORGANIZED HEALTH CARE EDUCATION/TRAINING PROGRAM

## 2023-08-18 PROCEDURE — 85610 PROTHROMBIN TIME: CPT | Performed by: STUDENT IN AN ORGANIZED HEALTH CARE EDUCATION/TRAINING PROGRAM

## 2023-08-18 PROCEDURE — 99232 PR SUBSEQUENT HOSPITAL CARE,LEVL II: ICD-10-PCS | Mod: GC,,, | Performed by: INTERNAL MEDICINE

## 2023-08-18 PROCEDURE — 25000003 PHARM REV CODE 250: Performed by: STUDENT IN AN ORGANIZED HEALTH CARE EDUCATION/TRAINING PROGRAM

## 2023-08-18 PROCEDURE — 99499 NO LOS: ICD-10-PCS | Mod: ,,, | Performed by: STUDENT IN AN ORGANIZED HEALTH CARE EDUCATION/TRAINING PROGRAM

## 2023-08-18 PROCEDURE — 99232 SBSQ HOSP IP/OBS MODERATE 35: CPT | Mod: GC,,, | Performed by: INTERNAL MEDICINE

## 2023-08-18 PROCEDURE — 20600001 HC STEP DOWN PRIVATE ROOM

## 2023-08-18 PROCEDURE — 83735 ASSAY OF MAGNESIUM: CPT | Performed by: STUDENT IN AN ORGANIZED HEALTH CARE EDUCATION/TRAINING PROGRAM

## 2023-08-18 PROCEDURE — 83036 HEMOGLOBIN GLYCOSYLATED A1C: CPT | Performed by: STUDENT IN AN ORGANIZED HEALTH CARE EDUCATION/TRAINING PROGRAM

## 2023-08-18 PROCEDURE — 85730 THROMBOPLASTIN TIME PARTIAL: CPT | Performed by: STUDENT IN AN ORGANIZED HEALTH CARE EDUCATION/TRAINING PROGRAM

## 2023-08-18 PROCEDURE — 84484 ASSAY OF TROPONIN QUANT: CPT | Performed by: STUDENT IN AN ORGANIZED HEALTH CARE EDUCATION/TRAINING PROGRAM

## 2023-08-18 PROCEDURE — 99499 UNLISTED E&M SERVICE: CPT | Mod: ,,, | Performed by: STUDENT IN AN ORGANIZED HEALTH CARE EDUCATION/TRAINING PROGRAM

## 2023-08-18 PROCEDURE — 80053 COMPREHEN METABOLIC PANEL: CPT | Performed by: STUDENT IN AN ORGANIZED HEALTH CARE EDUCATION/TRAINING PROGRAM

## 2023-08-18 PROCEDURE — 99223 PR INITIAL HOSPITAL CARE,LEVL III: ICD-10-PCS | Mod: ,,, | Performed by: STUDENT IN AN ORGANIZED HEALTH CARE EDUCATION/TRAINING PROGRAM

## 2023-08-18 PROCEDURE — 85025 COMPLETE CBC W/AUTO DIFF WBC: CPT | Performed by: STUDENT IN AN ORGANIZED HEALTH CARE EDUCATION/TRAINING PROGRAM

## 2023-08-18 PROCEDURE — 85730 THROMBOPLASTIN TIME PARTIAL: CPT | Mod: 91 | Performed by: STUDENT IN AN ORGANIZED HEALTH CARE EDUCATION/TRAINING PROGRAM

## 2023-08-18 PROCEDURE — 84100 ASSAY OF PHOSPHORUS: CPT | Performed by: STUDENT IN AN ORGANIZED HEALTH CARE EDUCATION/TRAINING PROGRAM

## 2023-08-18 RX ORDER — POTASSIUM CHLORIDE 20 MEQ/1
40 TABLET, EXTENDED RELEASE ORAL ONCE
Status: COMPLETED | OUTPATIENT
Start: 2023-08-18 | End: 2023-08-18

## 2023-08-18 RX ORDER — CLOPIDOGREL BISULFATE 75 MG/1
75 TABLET ORAL DAILY
Status: DISCONTINUED | OUTPATIENT
Start: 2023-08-19 | End: 2023-08-18

## 2023-08-18 RX ADMIN — PAROXETINE HYDROCHLORIDE 10 MG: 10 TABLET, FILM COATED ORAL at 12:08

## 2023-08-18 RX ADMIN — POTASSIUM CHLORIDE 40 MEQ: 1500 TABLET, EXTENDED RELEASE ORAL at 08:08

## 2023-08-18 RX ADMIN — HEPARIN SODIUM AND DEXTROSE 14 UNITS/KG/HR: 10000; 5 INJECTION INTRAVENOUS at 09:08

## 2023-08-18 RX ADMIN — LABETALOL HYDROCHLORIDE 20 MG: 5 INJECTION, SOLUTION INTRAVENOUS at 08:08

## 2023-08-18 RX ADMIN — HEPARIN SODIUM AND DEXTROSE 12 UNITS/KG/HR: 10000; 5 INJECTION INTRAVENOUS at 01:08

## 2023-08-18 RX ADMIN — LABETALOL HYDROCHLORIDE 20 MG: 5 INJECTION, SOLUTION INTRAVENOUS at 03:08

## 2023-08-18 RX ADMIN — VALSARTAN 320 MG: 160 TABLET, FILM COATED ORAL at 08:08

## 2023-08-18 RX ADMIN — ATORVASTATIN CALCIUM 80 MG: 20 TABLET, FILM COATED ORAL at 08:08

## 2023-08-18 RX ADMIN — CARVEDILOL 6.25 MG: 6.25 TABLET, FILM COATED ORAL at 09:08

## 2023-08-18 RX ADMIN — CARVEDILOL 6.25 MG: 6.25 TABLET, FILM COATED ORAL at 08:08

## 2023-08-18 RX ADMIN — LABETALOL HYDROCHLORIDE 20 MG: 5 INJECTION, SOLUTION INTRAVENOUS at 02:08

## 2023-08-18 RX ADMIN — CLOPIDOGREL BISULFATE 300 MG: 300 TABLET, FILM COATED ORAL at 12:08

## 2023-08-18 RX ADMIN — PAROXETINE HYDROCHLORIDE 10 MG: 10 TABLET, FILM COATED ORAL at 09:08

## 2023-08-18 RX ADMIN — ASPIRIN 81 MG: 81 TABLET, COATED ORAL at 08:08

## 2023-08-18 NOTE — ASSESSMENT & PLAN NOTE
"Patient's FSGs are controlled on current medication regimen.  Last A1c reviewed- No results found for: "LABA1C", "HGBA1C"  Most recent fingerstick glucose reviewed- No results for input(s): "POCTGLUCOSE" in the last 24 hours.  Current correctional scale  Low  Maintain anti-hyperglycemic dose as follows-   Antihyperglycemics (From admission, onward)    Start     Stop Route Frequency Ordered    08/18/23 0008  insulin aspart U-100 pen 0-5 Units         -- SubQ Before meals & nightly PRN 08/17/23 2311        Hold Oral hypoglycemics while patient is in the hospital.  "

## 2023-08-18 NOTE — SUBJECTIVE & OBJECTIVE
Interval History: NAEON      Review of Systems   Constitutional:  Negative for fatigue and fever.   Respiratory:  Negative for shortness of breath.    Cardiovascular:  Negative for chest pain and leg swelling.   Genitourinary:  Negative for dysuria.     Objective:     Vital Signs (Most Recent):  Temp: 98 °F (36.7 °C) (08/18/23 1107)  Pulse: 76 (08/18/23 1602)  Resp: 17 (08/18/23 1602)  BP: (!) 166/72 (08/18/23 1602)  SpO2: 98 % (08/18/23 1107) Vital Signs (24h Range):  Temp:  [97.2 °F (36.2 °C)-99.2 °F (37.3 °C)] 98 °F (36.7 °C)  Pulse:  [55-78] 76  Resp:  [12-18] 17  SpO2:  [94 %-100 %] 98 %  BP: (140-187)/(63-84) 166/72     Weight: 88.5 kg (195 lb)  Body mass index is 33.47 kg/m².  No intake or output data in the 24 hours ending 08/18/23 1616      Physical Exam  Constitutional:       General: She is not in acute distress.     Appearance: She is not ill-appearing.   Cardiovascular:      Rate and Rhythm: Normal rate and regular rhythm.      Pulses: Normal pulses.      Heart sounds: Normal heart sounds.   Pulmonary:      Effort: Pulmonary effort is normal.      Breath sounds: Normal breath sounds.   Abdominal:      General: Abdomen is flat.      Palpations: Abdomen is soft.   Musculoskeletal:      Right lower leg: No edema.      Left lower leg: No edema.   Skin:     Capillary Refill: Capillary refill takes less than 2 seconds.      Findings: No lesion.   Neurological:      General: No focal deficit present.      Mental Status: She is oriented to person, place, and time.   Psychiatric:         Mood and Affect: Mood normal.             Significant Labs: All pertinent labs within the past 24 hours have been reviewed.  CBC:   Recent Labs   Lab 08/17/23 2116 08/18/23  0037 08/18/23  0436   WBC 9.05 7.71 7.96   HGB 13.4 12.4 12.3   HCT 41.4 37.2 38.0    192 196     CMP:   Recent Labs   Lab 08/17/23 2116 08/18/23  0436    140   K 4.0 3.6    108   CO2 23 25   * 108   BUN 9 8   CREATININE 0.8 0.8    CALCIUM 9.0 8.6*   PROT 7.0 6.3   ALBUMIN 3.6 3.3*   BILITOT 0.5 0.4   ALKPHOS 59 57   AST 22 19   ALT 17 13   ANIONGAP 11 7*     Cardiac Markers:   Recent Labs   Lab 08/17/23  2116   BNP 65     Coagulation:   Recent Labs   Lab 08/18/23  0037 08/18/23  0436 08/18/23  0752   INR 1.0  --   --    APTT 30.0   < > 33.0*    < > = values in this interval not displayed.     Troponin:   Recent Labs   Lab 08/17/23  2246 08/18/23  0131 08/18/23  0436   TROPONINI 0.450* 0.527* 0.479*       Significant Imaging: I have reviewed all pertinent imaging results/findings within the past 24 hours.

## 2023-08-18 NOTE — HPI
This is a 78-year-old woman with a past medical history of Diabetes mellitus Type 2, GERD, hyperlipidemia, cataracts, and hypertension who presented to the ED with lightheadedness, blurry vision follow 3-4 episodes of diarrhea. SHe says she had the same symptoms 8 days ago. Her blood pressure on admission was 168/78. She self reports that it was 178/120 at home prior to admission. On admission to Hospital Medicine team she had a BP of 210/110 and was given labetalol. Cardiology is consulted for possible HTN emergency v type I NSTEMI given wall motion abnormalities on new echo. She was started on ACS protocol. She denies chest discomfort and denies past history of chest pain. She says she has an esophageal ulcer that recently started causing heartburn after eating over the past two weeks but the last episode was Tuesday and resolved with PPI. She denies dyspnea on exertion, shortness of breath, palpitations, and orthopnea.     Notable labs: 0.450 --> 0.527 --> 0.479    Cardiology Procedures:  ECHO 8/18/23: Estimated ejection fraction of 50-55%. There is normal diastolic function. Left atrium is severly dilated. Regional wall motion abnormalities present in left ventricle. Moderate aortic valve sclerosis with mild annular calcification and mild aortic regurgitation.  The following segments are hypokinetic: basal inferolateral, mid anteroseptal, mid inferoseptal and apical septal.    Stress test 12/2021: Normal systolic function. Study negative for myocardial ischemia.

## 2023-08-18 NOTE — ED NOTES
Telemetry Verification    Box Number: [id number]  Rate: 57  Rhythm: Sinus Bradycardia  Monitor Tech: War room

## 2023-08-18 NOTE — ASSESSMENT & PLAN NOTE
Cool peripheral pulses with normal sensation and sluggish capillary refill. No signs or symptoms of limb ischemia at rest and will continue to monitor.

## 2023-08-18 NOTE — HPI
Patient is a 78-year-old female with past medical history significant for T2 dm/hypertension/hyperlipidemia/PID who presented after feeling lightheaded approximately 4 hours prior to presentation.  She states that she had an episode of lightheadedness as well as abnormal vision/visual disturbances which arose in her left visual field and cut across her vision.  She is never had any previous episode similar to this and she is been otherwise asymptomatic with the exception of 3 episodes of nonbloody diarrhea prior to present.  She called her daughter regarding her symptoms because she felt that something was very wrong and subsequently called 911 due to concern for a stroke.  Symptoms have resolved since arriving to the emergency department however she was subsequently found to be in hypertensive emergency with elevated troponins and evidence of T-wave inversion/ST segment depression on EKG.  She was admitted to hospital medicine following initiation of ACS protocol and her blood pressure on my assessment was greater than 210/110.  P.r.n. labetalol was ordered.  She denies any cardiac symptoms including any chest pain/pressure, shortness of breath, palpitations, abdominal pain, vomiting, although she has had some nausea since she arrived.  Her visual disturbances have completely resolved.  She denies any fevers, chills, night sweats, or ongoing abnormal sensations.  She did not have any weakness of any peripheral limb nor is there evidence of facial droop. History is collected from the patient as well as her daughter who assists in completing the history of presenting complaint. She states that she had an angiogram in the past but is unsure if this was for her PAD or CAD and does not know the results or why she sees a cardiologist.

## 2023-08-18 NOTE — SUBJECTIVE & OBJECTIVE
Past Medical History:   Diagnosis Date    Anxiety     Atherosclerotic peripheral vascular disease     Diabetes mellitus, type 2     Gastroesophageal reflux disease     Hypercholesterolemia     Hypertension        Past Surgical History:   Procedure Laterality Date    APPENDECTOMY      BREAST BIOPSY Left     CATARACT EXTRACTION       SECTION      TUBAL LIGATION         Review of patient's allergies indicates:   Allergen Reactions    Codeine Nausea And Vomiting    Pneumoc 13-frida conj-dip cr(pf) Other (See Comments) and Shortness Of Breath     Asthma    Pneumococcal 23-frida ps vaccine Other (See Comments) and Shortness Of Breath     Asthuma    Erythromycin     Mycinette [cetylpyridinium-benzocaine]     Penicillins     Sulfur     Tetracyclines     Influenza virus vaccines Hives and Rash       No current facility-administered medications on file prior to encounter.     Current Outpatient Medications on File Prior to Encounter   Medication Sig    ascorbic acid, vitamin C, (VITAMIN C) 500 MG tablet Take 500 mg by mouth once daily.    aspirin (ECOTRIN) 81 MG EC tablet Take 81 mg by mouth once daily.    atorvastatin (LIPITOR) 20 MG tablet Take 20 mg by mouth.    blood sugar diagnostic Strp CHECK BLOOD SUGAR THREE TIMES A DAY    carvediloL (COREG) 6.25 MG tablet Take 6.25 mg by mouth 2 (two) times daily.    ergocalciferol, vitamin D2, (VITAMIN D ORAL) Take by mouth.    glipiZIDE (GLUCOTROL) 2.5 MG TR24 Take 2.5 mg by mouth.    olmesartan (BENICAR) 40 MG tablet Take 40 mg by mouth once daily.    omeprazole (PRILOSEC) 20 MG capsule Take 20 mg by mouth.    paroxetine (PAXIL) 10 MG tablet TAKE 1 TABLET BY MOUTH EVERY DAY     Family History       Problem Relation (Age of Onset)    Cancer Mother    Diabetes Mother, Father, Brother    Heart attack Brother    Heart disease Father, Brother    Hypertension Mother, Father    Stroke Brother          Tobacco Use    Smoking status: Never    Smokeless tobacco: Never   Substance and  "Sexual Activity    Alcohol use: Yes     Comment: "Maybe at [place]."    Drug use: No    Sexual activity: Never     Review of Systems   Constitutional:  Positive for activity change. Negative for chills and fever.   HENT:  Negative for congestion and sore throat.    Eyes:  Positive for visual disturbance.   Respiratory:  Negative for chest tightness and shortness of breath.    Cardiovascular:  Negative for chest pain and palpitations.   Gastrointestinal:  Positive for diarrhea and nausea. Negative for abdominal distention, abdominal pain, constipation and vomiting.   Musculoskeletal:  Negative for back pain.   Skin:  Negative for rash.   Neurological:  Negative for dizziness, weakness and headaches.   Hematological:  Does not bruise/bleed easily.     Objective:     Vital Signs (Most Recent):  Temp: 99.2 °F (37.3 °C) (08/18/23 0030)  Pulse: 61 (08/18/23 0030)  Resp: 12 (08/18/23 0030)  BP: (!) 146/67 (08/18/23 0030)  SpO2: 95 % (08/18/23 0030) Vital Signs (24h Range):  Temp:  [98.6 °F (37 °C)-99.2 °F (37.3 °C)] 99.2 °F (37.3 °C)  Pulse:  [61-78] 61  Resp:  [12-18] 12  SpO2:  [95 %-99 %] 95 %  BP: (146-187)/(63-84) 146/67     Weight: 88.5 kg (195 lb)  Body mass index is 33.47 kg/m².     Physical Exam  Vitals and nursing note reviewed.   Constitutional:       General: She is not in acute distress.     Appearance: Normal appearance. She is obese. She is not ill-appearing or toxic-appearing.      Comments: Daughter at bedside   HENT:      Head: Normocephalic and atraumatic.      Mouth/Throat:      Mouth: Mucous membranes are moist.   Eyes:      General: No scleral icterus.  Cardiovascular:      Rate and Rhythm: Normal rate and regular rhythm.      Pulses: Normal pulses.      Heart sounds: Normal heart sounds.   Pulmonary:      Effort: Pulmonary effort is normal. No respiratory distress.      Breath sounds: Normal breath sounds.   Abdominal:      General: There is no distension.      Palpations: Abdomen is soft. "   Musculoskeletal:         General: Swelling (mild ankle swelling bilaterally.) present.      Right lower leg: No edema.      Left lower leg: No edema.   Skin:     General: Skin is dry.      Capillary Refill: Capillary refill takes less than 2 seconds.      Coloration: Skin is not jaundiced.      Comments: Cool lower extremities with 1+ symmetric DP pulses   Neurological:      General: No focal deficit present.      Mental Status: She is alert and oriented to person, place, and time.           EKG shows diffuse TWI and ST depression with normal sinus rhythm. Suggestive of non-specific ischemia in non-coronary artery distribution. Consistent with hypertensive emergency. Qtc calculated at 478 ms.     Significant Labs: All pertinent labs within the past 24 hours have been reviewed.    Significant Imaging: I have reviewed all pertinent imaging results/findings within the past 24 hours.

## 2023-08-18 NOTE — PLAN OF CARE
Pt AAO, VSS. Upon discharge, all PIV's were removed and AVS reviewed with pt. All questions were answered. Pt left via ...

## 2023-08-18 NOTE — SUBJECTIVE & OBJECTIVE
Past Medical History:   Diagnosis Date    Anxiety     Atherosclerotic peripheral vascular disease     Diabetes mellitus, type 2     Gastroesophageal reflux disease     Hypercholesterolemia     Hypertension        Past Surgical History:   Procedure Laterality Date    APPENDECTOMY      BREAST BIOPSY Left     CATARACT EXTRACTION       SECTION      TUBAL LIGATION         Review of patient's allergies indicates:   Allergen Reactions    Codeine Nausea And Vomiting    Pneumoc 13-frida conj-dip cr(pf) Other (See Comments) and Shortness Of Breath     Asthma    Pneumococcal 23-frida ps vaccine Other (See Comments) and Shortness Of Breath     Asthuma    Erythromycin     Mycinette [cetylpyridinium-benzocaine]     Penicillins     Sulfur     Tetracyclines     Influenza virus vaccines Hives and Rash       No current facility-administered medications on file prior to encounter.     Current Outpatient Medications on File Prior to Encounter   Medication Sig    ascorbic acid, vitamin C, (VITAMIN C) 500 MG tablet Take 500 mg by mouth once daily.    aspirin (ECOTRIN) 81 MG EC tablet Take 81 mg by mouth once daily.    atorvastatin (LIPITOR) 20 MG tablet Take 20 mg by mouth.    blood sugar diagnostic Strp CHECK BLOOD SUGAR THREE TIMES A DAY    carvediloL (COREG) 6.25 MG tablet Take 6.25 mg by mouth 2 (two) times daily.    ergocalciferol, vitamin D2, (VITAMIN D ORAL) Take by mouth.    glipiZIDE (GLUCOTROL) 2.5 MG TR24 Take 2.5 mg by mouth.    olmesartan (BENICAR) 40 MG tablet Take 40 mg by mouth once daily.    omeprazole (PRILOSEC) 20 MG capsule Take 20 mg by mouth.    paroxetine (PAXIL) 10 MG tablet TAKE 1 TABLET BY MOUTH EVERY DAY     Family History       Problem Relation (Age of Onset)    Cancer Mother    Diabetes Mother, Father, Brother    Heart attack Brother    Heart disease Father, Brother    Hypertension Mother, Father    Stroke Brother          Tobacco Use    Smoking status: Never    Smokeless tobacco: Never   Substance and  "Sexual Activity    Alcohol use: Yes     Comment: "Maybe at [place]."    Drug use: No    Sexual activity: Never     Review of Systems   Constitutional: Negative for chills, diaphoresis and fever.   Cardiovascular:  Positive for near-syncope (prior to admission). Negative for chest pain, claudication, dyspnea on exertion, irregular heartbeat, leg swelling, orthopnea, palpitations and syncope.   Gastrointestinal:  Positive for diarrhea. Negative for abdominal pain, constipation, nausea and vomiting.   Neurological:  Positive for headaches. Negative for dizziness.     Objective:     Vital Signs (Most Recent):  Temp: 97.1 °F (36.2 °C) (08/18/23 1628)  Pulse: 66 (08/18/23 1725)  Resp: 18 (08/18/23 1628)  BP: (!) 144/65 (08/18/23 1725)  SpO2: 97 % (08/18/23 1628) Vital Signs (24h Range):  Temp:  [97.1 °F (36.2 °C)-99.2 °F (37.3 °C)] 97.1 °F (36.2 °C)  Pulse:  [55-78] 66  Resp:  [12-18] 18  SpO2:  [94 %-100 %] 97 %  BP: (140-187)/(63-87) 144/65     Weight: 88.5 kg (195 lb)  Body mass index is 33.47 kg/m².    SpO2: 97 %       No intake or output data in the 24 hours ending 08/18/23 1743    Lines/Drains/Airways       Peripheral Intravenous Line  Duration                  Peripheral IV - Single Lumen 08/17/23 2016 Anterior;Left;Proximal Forearm <1 day                     Physical Exam  Vitals and nursing note reviewed.   Constitutional:       Appearance: Normal appearance. She is obese.   HENT:      Head: Normocephalic and atraumatic.   Eyes:      General: No scleral icterus.     Extraocular Movements: Extraocular movements intact.      Conjunctiva/sclera: Conjunctivae normal.   Cardiovascular:      Rate and Rhythm: Normal rate and regular rhythm.      Pulses: Normal pulses.      Heart sounds: Normal heart sounds.   Pulmonary:      Effort: Pulmonary effort is normal. No respiratory distress.      Breath sounds: No wheezing or rales.   Abdominal:      Palpations: Abdomen is soft.      Tenderness: There is no abdominal " tenderness.   Musculoskeletal:      Cervical back: Normal range of motion and neck supple.      Right lower leg: No edema.      Left lower leg: No edema.   Skin:     General: Skin is warm and dry.   Neurological:      Mental Status: She is alert and oriented to person, place, and time.   Psychiatric:         Mood and Affect: Mood normal.          Significant Labs: CMP   Recent Labs   Lab 08/17/23 2116 08/18/23  0436    140   K 4.0 3.6    108   CO2 23 25   * 108   BUN 9 8   CREATININE 0.8 0.8   CALCIUM 9.0 8.6*   PROT 7.0 6.3   ALBUMIN 3.6 3.3*   BILITOT 0.5 0.4   ALKPHOS 59 57   AST 22 19   ALT 17 13   ANIONGAP 11 7*   , CBC   Recent Labs   Lab 08/17/23 2116 08/18/23  0037 08/18/23  0436   WBC 9.05 7.71 7.96   HGB 13.4 12.4 12.3   HCT 41.4 37.2 38.0    192 196   , and Troponin   Recent Labs   Lab 08/17/23  2246 08/18/23  0131 08/18/23  0436   TROPONINI 0.450* 0.527* 0.479*

## 2023-08-18 NOTE — H&P
[facility] - Emergency Dept  [facility] Medicine  History & Physical    Patient Name: Beatrice Shankar  MRN: [id number]  Patient Class: IP- Inpatient  Admission Date: 8/17/2023  Attending Physician: Wild Holden MD   Primary Care Provider: Sena Bales MD         Patient information was obtained from patient, past medical records and ER records.     Subjective:     Principal Problem:NSTEMI (non-ST elevated myocardial infarction)    Chief Complaint:   Chief Complaint   Patient presents with    Diarrhea     Pt c/o diarrhea x 3-4 episodes of diarrhea this am.  States felt lightheaded afterwards.  Pt states her BP was elevated today as well-178/120        HPI: Patient is a 78-year-old female with past medical history significant for T2 dm/hypertension/hyperlipidemia/PID who presented after feeling lightheaded approximately 4 hours prior to presentation.  She states that she had an episode of lightheadedness as well as abnormal vision/visual disturbances which arose in her left visual field and cut across her vision.  She is never had any previous episode similar to this and she is been otherwise asymptomatic with the exception of 3 episodes of nonbloody diarrhea prior to present.  She called her daughter regarding her symptoms because she felt that something was very wrong and subsequently called 911 due to concern for a stroke.  Symptoms have resolved since arriving to the emergency department however she was subsequently found to be in hypertensive emergency with elevated troponins and evidence of T-wave inversion/ST segment depression on EKG.  She was admitted to hospital medicine following initiation of ACS protocol and her blood pressure on my assessment was greater than 210/110.  P.r.n. labetalol was ordered.  She denies any cardiac symptoms including any chest pain/pressure, shortness of breath, palpitations, abdominal pain, vomiting, although she has had some nausea since she arrived.  Her visual  disturbances have completely resolved.  She denies any fevers, chills, night sweats, or ongoing abnormal sensations.  She did not have any weakness of any peripheral limb nor is there evidence of facial droop. History is collected from the patient as well as her daughter who assists in completing the history of presenting complaint. She states that she had an angiogram in the past but is unsure if this was for her PAD or CAD and does not know the results or why she sees a cardiologist.          Past Medical History:   Diagnosis Date    Anxiety     Atherosclerotic peripheral vascular disease     Diabetes mellitus, type 2     Gastroesophageal reflux disease     Hypercholesterolemia     Hypertension        Past Surgical History:   Procedure Laterality Date    APPENDECTOMY      BREAST BIOPSY Left     CATARACT EXTRACTION       SECTION      TUBAL LIGATION         Review of patient's allergies indicates:   Allergen Reactions    Codeine Nausea And Vomiting    Pneumoc 13-frida conj-dip cr(pf) Other (See Comments) and Shortness Of Breath     Asthma    Pneumococcal 23-frida ps vaccine Other (See Comments) and Shortness Of Breath     Asthuma    Erythromycin     Mycinette [cetylpyridinium-benzocaine]     Penicillins     Sulfur     Tetracyclines     Influenza virus vaccines Hives and Rash       No current facility-administered medications on file prior to encounter.     Current Outpatient Medications on File Prior to Encounter   Medication Sig    ascorbic acid, vitamin C, (VITAMIN C) 500 MG tablet Take 500 mg by mouth once daily.    aspirin (ECOTRIN) 81 MG EC tablet Take 81 mg by mouth once daily.    atorvastatin (LIPITOR) 20 MG tablet Take 20 mg by mouth.    blood sugar diagnostic Strp CHECK BLOOD SUGAR THREE TIMES A DAY    carvediloL (COREG) 6.25 MG tablet Take 6.25 mg by mouth 2 (two) times daily.    ergocalciferol, vitamin D2, (VITAMIN D ORAL) Take by mouth.    glipiZIDE (GLUCOTROL) 2.5 MG TR24  "Take 2.5 mg by mouth.    olmesartan (BENICAR) 40 MG tablet Take 40 mg by mouth once daily.    omeprazole (PRILOSEC) 20 MG capsule Take 20 mg by mouth.    paroxetine (PAXIL) 10 MG tablet TAKE 1 TABLET BY MOUTH EVERY DAY     Family History       Problem Relation (Age of Onset)    Cancer Mother    Diabetes Mother, Father, Brother    Heart attack Brother    Heart disease Father, Brother    Hypertension Mother, Father    Stroke Brother          Tobacco Use    Smoking status: Never    Smokeless tobacco: Never   Substance and Sexual Activity    Alcohol use: Yes     Comment: "Maybe at [place]."    Drug use: No    Sexual activity: Never     Review of Systems   Constitutional:  Positive for activity change. Negative for chills and fever.   HENT:  Negative for congestion and sore throat.    Eyes:  Positive for visual disturbance.   Respiratory:  Negative for chest tightness and shortness of breath.    Cardiovascular:  Negative for chest pain and palpitations.   Gastrointestinal:  Positive for diarrhea and nausea. Negative for abdominal distention, abdominal pain, constipation and vomiting.   Musculoskeletal:  Negative for back pain.   Skin:  Negative for rash.   Neurological:  Negative for dizziness, weakness and headaches.   Hematological:  Does not bruise/bleed easily.     Objective:     Vital Signs (Most Recent):  Temp: 99.2 °F (37.3 °C) (08/18/23 0030)  Pulse: 61 (08/18/23 0030)  Resp: 12 (08/18/23 0030)  BP: (!) 146/67 (08/18/23 0030)  SpO2: 95 % (08/18/23 0030) Vital Signs (24h Range):  Temp:  [98.6 °F (37 °C)-99.2 °F (37.3 °C)] 99.2 °F (37.3 °C)  Pulse:  [61-78] 61  Resp:  [12-18] 12  SpO2:  [95 %-99 %] 95 %  BP: (146-187)/(63-84) 146/67     Weight: 88.5 kg (195 lb)  Body mass index is 33.47 kg/m².     Physical Exam  Vitals and nursing note reviewed.   Constitutional:       General: She is not in acute distress.     Appearance: Normal appearance. She is obese. She is not ill-appearing or toxic-appearing.      " Comments: Daughter at bedside   HENT:      Head: Normocephalic and atraumatic.      Mouth/Throat:      Mouth: Mucous membranes are moist.   Eyes:      General: No scleral icterus.  Cardiovascular:      Rate and Rhythm: Normal rate and regular rhythm.      Pulses: Normal pulses.      Heart sounds: Normal heart sounds.   Pulmonary:      Effort: Pulmonary effort is normal. No respiratory distress.      Breath sounds: Normal breath sounds.   Abdominal:      General: There is no distension.      Palpations: Abdomen is soft.   Musculoskeletal:         General: Swelling (mild ankle swelling bilaterally.) present.      Right lower leg: No edema.      Left lower leg: No edema.   Skin:     General: Skin is dry.      Capillary Refill: Capillary refill takes less than 2 seconds.      Coloration: Skin is not jaundiced.      Comments: Cool lower extremities with 1+ symmetric DP pulses   Neurological:      General: No focal deficit present.      Mental Status: She is alert and oriented to person, place, and time.           EKG shows diffuse TWI and ST depression with normal sinus rhythm. Suggestive of non-specific ischemia in non-coronary artery distribution. Consistent with hypertensive emergency. Qtc calculated at 478 ms.     Significant Labs: All pertinent labs within the past 24 hours have been reviewed.    Significant Imaging: I have reviewed all pertinent imaging results/findings within the past 24 hours.    Assessment/Plan:     * NSTEMI (non-ST elevated myocardial infarction)  Patient is a 79 yo F with pmh of htn, hld, PAD, and t2dm who presented with blurred vision and altered sensations by EMS after calling 911. She states that she was concerned that she was having a stroke and called her daughter and felt that something was wrong. She then called 911. She describes an arc light visual disturbance across her left visual field which has resolved. On presentation to the ED, an EKG was noted to have some mild TWI. This  "prompted cardiac workup which demonstrated elevated troponins in the absence of symptoms. Her vital signs since admission are notable for hypertensive urgency/emergency. Her initial troponin was 0.3 which increased to 0.45. Her last BP in the ED was 210/110 on my assessment and PRN anti-htn medications were ordered. She denies any cardiac symptoms. No chest pain/pressure. She denies shortness of breath or abdominal symptoms with the exception of diarrhea which she had 3 episodes of prior to arrival.    She was started on ACS protocol in the ED with a heparin drip and asa/clopidogrel load. She was not assessed by cardiology prior to initiation or admission.    Overall, patient's presentation is more consistent with hypertensive emergency. Will attempt to control blood pressure and repeat EKG while trending troponins.      Plan:  - DAPT with 81 mg ASA and 75 mg clopidogrel beginning TOMORROW  - load with 325 mg ASA and 300 mg clopidogrel TODAY  - atorvastatin increased to 80 qd  - Heparin gtt  - Carvedilol 6.25 BID  - GTN PRN for chest pain  - RBC transfusion if Hgb < 8 g/dL        Hypertensive emergency, no CHF  Hx of primary hypertension. BP consistently above 180/110 since presentation. Does not appear to have received PRN medications prior to admission.    Plan:  - valsartan 320 m qd, Labetalol 20 PRN IV q4hr, Carvedilol 6.25 BID  - Target BP <160/100  - Target LDL < 130, HDL > 40  - Magnesium > 2, Potassium > 4          Hypercholesterolemia  Increased atorv to 80 qd      Diabetes mellitus, type 2  Patient's FSGs are controlled on current medication regimen.  Last A1c reviewed- No results found for: "LABA1C", "HGBA1C"  Most recent fingerstick glucose reviewed- No results for input(s): "POCTGLUCOSE" in the last 24 hours.  Current correctional scale  Low  Maintain anti-hyperglycemic dose as follows-   Antihyperglycemics (From admission, onward)    Start     Stop Route Frequency Ordered    08/18/23 0008  insulin " aspart U-100 pen 0-5 Units         -- SubQ Before meals & nightly PRN 08/17/23 2311        Hold Oral hypoglycemics while patient is in the hospital.    Atherosclerotic peripheral vascular disease  Cool peripheral pulses with normal sensation and sluggish capillary refill. No signs or symptoms of limb ischemia at rest and will continue to monitor.      Essential hypertension  See htn emergency.     Will require dose adjustment of mediations prior to discharge.         VTE Risk Mitigation (From admission, onward)         Ordered     heparin 25,000 units in dextrose 5% (100 units/ml) IV bolus from bag - ADDITIONAL PRN BOLUS - 60 units/kg (max bolus 4000 units)  As needed (PRN)        Question:  Heparin Infusion Adjustment (DO NOT MODIFY ANSWER)  Answer:  \\[email or web address]\epic\Images\Pharmacy\HeparinInfusions\heparin LOW INTENSITY nomogram for [facility] [id number].pdf    08/17/23 2250     heparin 25,000 units in dextrose 5% (100 units/ml) IV bolus from bag - ADDITIONAL PRN BOLUS - 30 units/kg (max bolus 4000 units)  As needed (PRN)        Question:  Heparin Infusion Adjustment (DO NOT MODIFY ANSWER)  Answer:  \\[email or web address]\epic\Images\Pharmacy\HeparinInfusions\heparin LOW INTENSITY nomogram for [facility] [id number].pdf    08/17/23 2250     heparin 25,000 units in dextrose 5% (100 units/ml) IV bolus from bag INITIAL BOLUS (max bolus 4000 units)  Once        Question:  Heparin Infusion Adjustment (DO NOT MODIFY ANSWER)  Answer:  \\[email or web address]\epic\Images\Pharmacy\HeparinInfusions\heparin LOW INTENSITY nomogram for [facility] [id number].pdf    08/17/23 2250     heparin 25,000 units in dextrose 5% 250 mL (100 units/mL) infusion LOW INTENSITY nomogram - [facility]  Continuous        Question Answer Comment   Heparin Infusion Adjustment (DO NOT MODIFY ANSWER) \\[email or web address]\epic\Images\Pharmacy\HeparinInfusions\heparin LOW INTENSITY nomogram for [facility] [id number].pdf    Begin at (in units/kg/hr) 12        08/17/23 2250     Reason for No Pharmacological VTE Prophylaxis  Once         Question:  Reasons:  Answer:  IV Heparin w/in 24 hrs. Pre or Post-Op    08/17/23 2311     IP VTE HIGH RISK PATIENT  Once         08/17/23 2311     Place sequential compression device  Until discontinued         08/17/23 2311                           Adrian Hoyt MD  Department of Hospital Medicine  [facility] - Emergency Dept

## 2023-08-18 NOTE — PROGRESS NOTES
[facility] - Cardiology [facility] Medicine  Progress Note    Patient Name: Beatrice Shankar  MRN: [id number]  Patient Class: IP- Inpatient   Admission Date: 8/17/2023  Length of Stay: 1 days  Attending Physician: Yaya Fay MD  Primary Care Provider: Sena Bales MD        Subjective:     Principal Problem:NSTEMI (non-ST elevated myocardial infarction)        HPI:  Patient is a 78-year-old female with past medical history significant for T2 dm/hypertension/hyperlipidemia/PID who presented after feeling lightheaded approximately 4 hours prior to presentation.  She states that she had an episode of lightheadedness as well as abnormal vision/visual disturbances which arose in her left visual field and cut across her vision.  She is never had any previous episode similar to this and she is been otherwise asymptomatic with the exception of 3 episodes of nonbloody diarrhea prior to present.  She called her daughter regarding her symptoms because she felt that something was very wrong and subsequently called 911 due to concern for a stroke.  Symptoms have resolved since arriving to the emergency department however she was subsequently found to be in hypertensive emergency with elevated troponins and evidence of T-wave inversion/ST segment depression on EKG.  She was admitted to hospital medicine following initiation of ACS protocol and her blood pressure on my assessment was greater than 210/110.  P.r.n. labetalol was ordered.  She denies any cardiac symptoms including any chest pain/pressure, shortness of breath, palpitations, abdominal pain, vomiting, although she has had some nausea since she arrived.  Her visual disturbances have completely resolved.  She denies any fevers, chills, night sweats, or ongoing abnormal sensations.  She did not have any weakness of any peripheral limb nor is there evidence of facial droop. History is collected from the patient as well as her daughter who assists in  completing the history of presenting complaint. She states that she had an angiogram in the past but is unsure if this was for her PAD or CAD and does not know the results or why she sees a cardiologist.          Overview/Hospital Course:  No events overnight, trop peaked at 0.5, TTE with new WMAs, cardiology consulted, continued on ACS protocol.      Interval History: NAEON      Review of Systems   Constitutional:  Negative for fatigue and fever.   Respiratory:  Negative for shortness of breath.    Cardiovascular:  Negative for chest pain and leg swelling.   Genitourinary:  Negative for dysuria.     Objective:     Vital Signs (Most Recent):  Temp: 98 °F (36.7 °C) (08/18/23 1107)  Pulse: 76 (08/18/23 1602)  Resp: 17 (08/18/23 1602)  BP: (!) 166/72 (08/18/23 1602)  SpO2: 98 % (08/18/23 1107) Vital Signs (24h Range):  Temp:  [97.2 °F (36.2 °C)-99.2 °F (37.3 °C)] 98 °F (36.7 °C)  Pulse:  [55-78] 76  Resp:  [12-18] 17  SpO2:  [94 %-100 %] 98 %  BP: (140-187)/(63-84) 166/72     Weight: 88.5 kg (195 lb)  Body mass index is 33.47 kg/m².  No intake or output data in the 24 hours ending 08/18/23 1616      Physical Exam  Constitutional:       General: She is not in acute distress.     Appearance: She is not ill-appearing.   Cardiovascular:      Rate and Rhythm: Normal rate and regular rhythm.      Pulses: Normal pulses.      Heart sounds: Normal heart sounds.   Pulmonary:      Effort: Pulmonary effort is normal.      Breath sounds: Normal breath sounds.   Abdominal:      General: Abdomen is flat.      Palpations: Abdomen is soft.   Musculoskeletal:      Right lower leg: No edema.      Left lower leg: No edema.   Skin:     Capillary Refill: Capillary refill takes less than 2 seconds.      Findings: No lesion.   Neurological:      General: No focal deficit present.      Mental Status: She is oriented to person, place, and time.   Psychiatric:         Mood and Affect: Mood normal.             Significant Labs: All pertinent labs  within the past 24 hours have been reviewed.  CBC:   Recent Labs   Lab 08/17/23 2116 08/18/23  0037 08/18/23  0436   WBC 9.05 7.71 7.96   HGB 13.4 12.4 12.3   HCT 41.4 37.2 38.0    192 196     CMP:   Recent Labs   Lab 08/17/23 2116 08/18/23  0436    140   K 4.0 3.6    108   CO2 23 25   * 108   BUN 9 8   CREATININE 0.8 0.8   CALCIUM 9.0 8.6*   PROT 7.0 6.3   ALBUMIN 3.6 3.3*   BILITOT 0.5 0.4   ALKPHOS 59 57   AST 22 19   ALT 17 13   ANIONGAP 11 7*     Cardiac Markers:   Recent Labs   Lab 08/17/23 2116   BNP 65     Coagulation:   Recent Labs   Lab 08/18/23 0037 08/18/23 0436 08/18/23  0752   INR 1.0  --   --    APTT 30.0   < > 33.0*    < > = values in this interval not displayed.     Troponin:   Recent Labs   Lab 08/17/23 2246 08/18/23  0131 08/18/23  0436   TROPONINI 0.450* 0.527* 0.479*       Significant Imaging: I have reviewed all pertinent imaging results/findings within the past 24 hours.      Assessment/Plan:      * NSTEMI (non-ST elevated myocardial infarction)  Patient is a 79 yo F with pmh of htn, hld, PAD, and t2dm who presented with blurred vision and altered sensations by EMS after calling 911. She states that she was concerned that she was having a stroke and called her daughter and felt that something was wrong. She then called 911. She describes an arc light visual disturbance across her left visual field which has resolved. On presentation to the ED, an EKG was noted to have some mild TWI. This prompted cardiac workup which demonstrated elevated troponins in the absence of symptoms. Her vital signs since admission are notable for hypertensive urgency/emergency. Her initial troponin was 0.3 which increased to 0.45. Her last BP in the ED was 210/110 on my assessment and PRN anti-htn medications were ordered. She denies any cardiac symptoms. No chest pain/pressure. She denies shortness of breath or abdominal symptoms with the exception of diarrhea which she had 3 episodes  "of prior to arrival.    She was started on ACS protocol in the ED with a heparin drip and asa/clopidogrel load. She was not assessed by cardiology prior to initiation or admission.    Overall, patient's presentation is more consistent with hypertensive emergency. Will attempt to control blood pressure and repeat EKG while trending troponins.      Plan:  - DAPT with 81 mg ASA and 75 mg clopidogrel   - s/p load with 325 mg ASA and 300 mg clopidogrel   - atorvastatin increased to 80 qd  - Heparin gtt  - Carvedilol 6.25 BID  - GTN PRN for chest pain  - RBC transfusion if Hgb < 8 g/dL  - Cardiology consulted due to WMAs in setting troponin elevation, follow up recs        Hypertensive emergency, no CHF  Hx of primary hypertension. BP consistently above 180/110 since presentation. Does not appear to have received PRN medications prior to admission.    Plan:  - valsartan 320 m qd, Labetalol 20 PRN IV q4hr, Carvedilol 6.25 BID  - Target BP <160/100  - Target LDL < 130, HDL > 40  - Magnesium > 2, Potassium > 4          Hypercholesterolemia  Increased atorv to 80 qd      Diabetes mellitus, type 2  Patient's FSGs are controlled on current medication regimen.  Last A1c reviewed- No results found for: "LABA1C", "HGBA1C"  Most recent fingerstick glucose reviewed- No results for input(s): "POCTGLUCOSE" in the last 24 hours.  Current correctional scale  Low  Maintain anti-hyperglycemic dose as follows-   Antihyperglycemics (From admission, onward)    Start     Stop Route Frequency Ordered    08/18/23 0008  insulin aspart U-100 pen 0-5 Units         -- SubQ Before meals & nightly PRN 08/17/23 2311        Hold Oral hypoglycemics while patient is in the hospital.    Atherosclerotic peripheral vascular disease  Cool peripheral pulses with normal sensation and sluggish capillary refill. No signs or symptoms of limb ischemia at rest and will continue to monitor.      Essential hypertension  See htn emergency.     Will require dose " adjustment of mediations prior to discharge.         VTE Risk Mitigation (From admission, onward)         Ordered     heparin 25,000 units in dextrose 5% (100 units/ml) IV bolus from bag - ADDITIONAL PRN BOLUS - 60 units/kg (max bolus 4000 units)  As needed (PRN)        Question:  Heparin Infusion Adjustment (DO NOT MODIFY ANSWER)  Answer:  [email or web address]\epic\Images\Pharmacy\HeparinInfusions\heparin LOW INTENSITY nomogram for [facility] [id number]    08/17/23 2250     heparin 25,000 units in dextrose 5% (100 units/ml) IV bolus from bag - ADDITIONAL PRN BOLUS - 30 units/kg (max bolus 4000 units)  As needed (PRN)        Question:  Heparin Infusion Adjustment (DO NOT MODIFY ANSWER)  Answer:  [email or web address]\epic\Images\Pharmacy\HeparinInfusions\heparin LOW INTENSITY nomogram for [facility] [id number]    08/17/23 2250     heparin 25,000 units in dextrose 5% 250 mL (100 units/mL) infusion LOW INTENSITY nomogram - [facility]  Continuous        Question Answer Comment   Heparin Infusion Adjustment (DO NOT MODIFY ANSWER) \\[email or web address]\epic\Images\Pharmacy\HeparinInfusions\heparin LOW INTENSITY nomogram for [facility] [id number]    Begin at (in units/kg/hr) 12        08/17/23 2250     Reason for No Pharmacological VTE Prophylaxis  Once        Question:  Reasons:  Answer:  IV Heparin w/in 24 hrs. Pre or Post-Op    08/17/23 2311     IP VTE HIGH RISK PATIENT  Once         08/17/23 2311     Place sequential compression device  Until discontinued         08/17/23 2311                Discharge Planning   SULEIMAN: 8/19/2023     Code Status: Full Code   Is the patient medically ready for discharge?:     Reason for patient still in hospital (select all that apply): Patient trending condition and Treatment  Discharge Plan A: Home with family                  Yaya Fay MD  Department of Hospital Medicine   [facility] - Cardiology Stepdown

## 2023-08-18 NOTE — ASSESSMENT & PLAN NOTE
Patient is a 77 yo F with pmh of htn, hld, PAD, and t2dm who presented with blurred vision and altered sensations by EMS after calling 911. She states that she was concerned that she was having a stroke and called her daughter and felt that something was wrong. She then called 911. She describes an arc light visual disturbance across her left visual field which has resolved. On presentation to the ED, an EKG was noted to have some mild TWI. This prompted cardiac workup which demonstrated elevated troponins in the absence of symptoms. Her vital signs since admission are notable for hypertensive urgency/emergency. Her initial troponin was 0.3 which increased to 0.45. Her last BP in the ED was 210/110 on my assessment and PRN anti-htn medications were ordered. She denies any cardiac symptoms. No chest pain/pressure. She denies shortness of breath or abdominal symptoms with the exception of diarrhea which she had 3 episodes of prior to arrival.    She was started on ACS protocol in the ED with a heparin drip and asa/clopidogrel load. She was not assessed by cardiology prior to initiation or admission.    Overall, patient's presentation is more consistent with hypertensive emergency. Will attempt to control blood pressure and repeat EKG while trending troponins.      Plan:  - DAPT with 81 mg ASA and 75 mg clopidogrel beginning TOMORROW  - load with 325 mg ASA and 300 mg clopidogrel TODAY  - atorvastatin increased to 80 qd  - Heparin gtt  - Carvedilol 6.25 BID  - GTN PRN for chest pain  - RBC transfusion if Hgb < 8 g/dL

## 2023-08-18 NOTE — ASSESSMENT & PLAN NOTE
Patient is a 77 yo F with pmh of htn, hld, PAD, and t2dm who presented with blurred vision and altered sensations by EMS after calling 911. She states that she was concerned that she was having a stroke and called her daughter and felt that something was wrong. She then called 911. She describes an arc light visual disturbance across her left visual field which has resolved. On presentation to the ED, an EKG was noted to have some mild TWI. This prompted cardiac workup which demonstrated elevated troponins in the absence of symptoms. Her vital signs since admission are notable for hypertensive urgency/emergency. Her initial troponin was 0.3 which increased to 0.45. Her last BP in the ED was 210/110 on my assessment and PRN anti-htn medications were ordered. She denies any cardiac symptoms. No chest pain/pressure. She denies shortness of breath or abdominal symptoms with the exception of diarrhea which she had 3 episodes of prior to arrival.    She was started on ACS protocol in the ED with a heparin drip and asa/clopidogrel load. She was not assessed by cardiology prior to initiation or admission.    Overall, patient's presentation is more consistent with hypertensive emergency. Will attempt to control blood pressure and repeat EKG while trending troponins.      Plan:  - DAPT with 81 mg ASA and 75 mg clopidogrel   - s/p load with 325 mg ASA and 300 mg clopidogrel   - atorvastatin increased to 80 qd  - Heparin gtt  - Carvedilol 6.25 BID  - GTN PRN for chest pain  - RBC transfusion if Hgb < 8 g/dL  - Cardiology consulted due to WMAs in setting troponin elevation, follow up recs

## 2023-08-18 NOTE — ED PROVIDER NOTES
Encounter Date: 2023       History     Chief Complaint   Patient presents with    Diarrhea     Pt c/o diarrhea x 3-4 episodes of diarrhea this am.  States felt lightheaded afterwards.  Pt states her BP was elevated today as well-178/120     78 y.o. female with pmhx of T2DM, GERD, HLD, HTN presents to ED after presyncopal episode occurring 3-4 hours ago. She was folding clothes when her symptoms started. She felt lightheaded and experienced blurry vision. She drank a liquid IV as she thought it could be due to dehydration. Notes poor PO intake prior to episode. She experienced 3-4 episodes of nonbloody diarrhea earlier today. Symptoms have resolved since arriving to the ED except for slight headache. She notes a similar episode 1 week ago though was not associated with diarrhea.  She denies fall or trauma. She denies chest pain, shortness of breath, palpitations, dizziness, paresthesias, dysuria, abdominal pain, fever, chills.    The history is provided by the patient.     Review of patient's allergies indicates:   Allergen Reactions    Codeine Nausea And Vomiting    Pneumoc 13-frida conj-dip cr(pf) Other (See Comments) and Shortness Of Breath     Asthma    Pneumococcal 23-frida ps vaccine Other (See Comments) and Shortness Of Breath     Asthuma    Erythromycin     Mycinette [cetylpyridinium-benzocaine]     Penicillins     Sulfur     Tetracyclines     Influenza virus vaccines Hives and Rash     Past Medical History:   Diagnosis Date    Anxiety     Atherosclerotic peripheral vascular disease     Diabetes mellitus, type 2     Gastroesophageal reflux disease     Hypercholesterolemia     Hypertension      Past Surgical History:   Procedure Laterality Date    APPENDECTOMY      BREAST BIOPSY Left     CATARACT EXTRACTION       SECTION      TUBAL LIGATION       Family History   Problem Relation Age of Onset    Cancer Mother     Hypertension Mother     Diabetes Mother     Hypertension Father     Diabetes Father      "Heart disease Father     Diabetes Brother     Heart disease Brother     Heart attack Brother     Stroke Brother      Social History     Tobacco Use    Smoking status: Never    Smokeless tobacco: Never   Substance Use Topics    Alcohol use: Yes     Comment: "Maybe at [place]."    Drug use: No     Review of Systems   Constitutional:  Negative for chills and fever.   Eyes:  Positive for visual disturbance.   Respiratory:  Negative for shortness of breath.    Cardiovascular:  Negative for chest pain, palpitations and leg swelling.   Gastrointestinal:  Positive for diarrhea. Negative for abdominal pain, blood in stool, nausea and vomiting.   Genitourinary:  Negative for dysuria.   Neurological:  Positive for light-headedness and headaches.       Physical Exam     Initial Vitals [08/17/23 1846]   BP Pulse Resp Temp SpO2   (!) 168/78 66 18 98.6 °F (37 °C) 99 %      MAP       --         Physical Exam    Nursing note and vitals reviewed.  Constitutional: She appears well-developed and well-nourished. She is not diaphoretic. No distress.   HENT:   Head: Normocephalic and atraumatic.   Nose: Nose normal.   Eyes: Conjunctivae and EOM are normal.   Neck: Neck supple.   Cardiovascular:  Normal rate, regular rhythm, normal heart sounds and intact distal pulses.           Pulmonary/Chest: Breath sounds normal. No respiratory distress.   Abdominal: Abdomen is soft. Bowel sounds are normal. There is no abdominal tenderness. There is no guarding.   Musculoskeletal:      Cervical back: Neck supple.      Right lower leg: No edema.      Left lower leg: No edema.     Neurological: She is alert and oriented to person, place, and time. Gait normal.   Skin: No rash noted.   Psychiatric: She has a normal mood and affect. Her behavior is normal. Judgment and thought content normal.         ED Course   Procedures  Labs Reviewed   HIV 1 / 2 ANTIBODY   HEPATITIS C ANTIBODY   CBC W/ AUTO DIFFERENTIAL   COMPREHENSIVE METABOLIC PANEL   MAGNESIUM "   TROPONIN I   B-TYPE NATRIURETIC PEPTIDE   PHOSPHORUS     EKG Readings: (Independently Interpreted)   Initial Reading: No STEMI. Rhythm: Normal Sinus Rhythm. Heart Rate: 72. T Waves Flipped: V1, V2, V3 and V4. Axis: Normal. Clinical Impression: Normal Sinus Rhythm       Imaging Results              X-Ray Chest AP Portable (Final result)  Result time 08/17/23 21:33:29      Final result by Oliver Oliveira MD (08/17/23 21:33:29)                   Impression:      No convincing radiographic evidence of acute intrathoracic process on this single view.      Electronically signed by: Oliver Oliveira MD  Date:    08/17/2023  Time:    21:33               Narrative:    EXAMINATION:  XR CHEST AP PORTABLE    CLINICAL HISTORY:  Syncope and collapse    TECHNIQUE:  Single frontal view of the chest was performed.    COMPARISON:  09/24/2020    FINDINGS:  Cardiac monitoring leads overlie the chest.  Cardiac silhouette is stable in size.  Lungs are symmetrically expanded with mild coarse interstitial attenuation.  No large confluent airspace consolidation appreciated.  No significant volume of pleural fluid or pneumothorax identified.  Visualized osseous structures demonstrate mild degenerative changes.                                       Medications   sodium chloride 0.9% bolus 1,000 mL 1,000 mL (1,000 mLs Intravenous New Bag 8/17/23 2116)   acetaminophen tablet 1,000 mg (has no administration in time range)     Medical Decision Making  78 y.o. female with pmhx of T2DM, GERD, HLD, HTN presents to ED after presyncopal episode occurring 3-4 hours ago.  She is nontoxic appearing.  Vitals with hypertension.  Oriented x3.  Exam is unremarkable. She is currently asymptomatic at this time.  Will obtain labs, give fluids. Will sign off to Dr. Cramer due to shift change.     Differential diagnosis include vasovagal episode, dehydration, arrhythmia, ACS, anemia    Amount and/or Complexity of Data Reviewed  Labs: ordered.  Radiology:  ordered.  ECG/medicine tests: ordered.    Risk  OTC drugs.  Decision regarding hospitalization.              Attending Attestation:     Physician Attestation Statement for NP/PA:   I have directed and reviewed the workup performed by the PA/NP.  I performed the substantive portion of the medical decision making.     Other NP/PA Attestation Additions:      Medical Decision Makin-year-old female presenting to emergency department with complaint of presyncope and diarrhea.  Originally seen by the PA, signed out to me pending the remainder of her workup for final disposition.  Her labs remarkable for a significantly elevated troponin.  I reassessed this patient, she denied chest pain or shortness of breath but did have generalized weakness.  Initial EKG was reviewed, no STEMI, but does have diffuse ST depressions, most notable inferiorly and laterally in V5 and V6.  I repeated an EKG after the troponin resulted, which has slightly worsening ST depressions but again no STEMI.  Concern for NSTEMI given the significant elevation of her troponin, EKG changes, and complaint of generalized weakness.  Will initiate ACS protocol at this time.  Per our protocol, she does not require emergency Cardiology evaluation because she does not have ongoing symptoms.  Case was discussed with internal medicine, and admitted to their service in stable condition.                             Clinical Impression:   Final diagnoses:  [R55] Pre-syncope  [R19.7] Diarrhea, unspecified type (Primary)               Gerri Richardson PA-C  [id number]       Dafne Cramer MD  [id number]

## 2023-08-18 NOTE — ASSESSMENT & PLAN NOTE
Hx of primary hypertension. BP consistently above 180/110 since presentation. Does not appear to have received PRN medications prior to admission.    Plan:  - valsartan 320 m qd, Labetalol 20 PRN IV q4hr, Carvedilol 6.25 BID  - Target BP <160/100  - Target LDL < 130, HDL > 40  - Magnesium > 2, Potassium > 4

## 2023-08-18 NOTE — PLAN OF CARE
[facility]  Initial Discharge Assessment       Primary Care Provider: Sena Bales MD    Admission Diagnosis: Pre-syncope [R55]  Elevated troponin [R77.8]  NSTEMI (non-ST elevated myocardial infarction) [I21.4]  Essential hypertension [I10]  Hypertensive emergency, no CHF [I16.1]  Hypertensive emergency [I16.1]  Type 2 diabetes mellitus with stage 1 chronic kidney disease, without long-term current use of insulin [E11.22, N18.1]  Diarrhea, unspecified type [R19.7]    Admission Date: 8/17/2023  Expected Discharge Date: 8/19/2023    Transition of Care Barriers: None    Payor: [employer] MEDICARE / Plan: [id number] 65 / Product Type: Medicare Advantage /     Extended Emergency Contact Information  Primary Emergency Contact: Dafne Watson   [facility]  Home Phone: [phone]  Relation: Daughter    Discharge Plan A: Home with family  Discharge Plan B: Home      [id number] - [place], [place] - [address]  [address]  [facility] [address]  Phone: [phone] Fax: [phone]    [id number] - Cl[facility] - [place] - [address] [place] [address]  Phone: [phone] Fax: [phone]      Initial Assessment (most recent)       Adult Discharge Assessment - 08/18/23 1112          Discharge Assessment    Assessment Type Discharge Planning Assessment     Confirmed/corrected address, phone number and insurance Yes     Confirmed Demographics Correct on Facesheet     Source of Information patient;family     Reason For Admission NSTEMI     People in Home child(michela), adult     Facility Arrived From: home     Do you expect to return to your current living situation? Yes     Do you have help at home or someone to help you manage your care at home? Yes     Who are your caregiver(s) and their phone number(s)? katina Watson [phone]     Prior to hospitilization cognitive status: Alert/Oriented     Current  cognitive status: Alert/Oriented     Equipment Currently Used at Home none     Do you currently have service(s) that help you manage your care at home? No     Do you have any problems affording any of your prescribed medications? No     Who is going to help you get home at discharge? daughter     How do you get to doctors appointments? family or friend will provide     Are you on dialysis? No     Do you take coumadin? No     DME Needed Upon Discharge  other (see comments)   TBD    Transition of Care Barriers None     Discharge Plan A Home with family     Discharge Plan B Home        Financial Resource Strain    How hard is it for you to pay for the very basics like food, housing, medical care, and heating? Not very hard        Housing Stability    In the last 12 months, was there a time when you were not able to pay the mortgage or rent on time? No     In the last 12 months, was there a time when you did not have a steady place to sleep or slept in a shelter (including now)? No        Transportation Needs    In the past 12 months, has lack of transportation kept you from medical appointments or from getting medications? No     In the past 12 months, has lack of transportation kept you from meetings, work, or from getting things needed for daily living? No        Food Insecurity    Within the past 12 months, you worried that your food would run out before you got the money to buy more. Never true     Within the past 12 months, the food you bought just didn't last and you didn't have money to get more. Never true        Social Connections    In a typical week, how many times do you talk on the phone with family, friends, or neighbors? More than three times a week     How often do you get together with friends or relatives? More than three times a week                   Pt lives with her adult daughter in an apartment with 4 small flights of stairs to enter.  Pt currently uses no home DME, no HD/BT/home O2.  Daughter  can provide transportation home at time of d/c.  Will continue to follow for d/c needs.    Sammy Willis RN CM  Case Management  [id number]

## 2023-08-18 NOTE — ASSESSMENT & PLAN NOTE
This is a 78-year-old woman with a past medical history of Diabetes mellitus Type 2, GERD, hyperlipidemia, hypertension who presented to the ED with lightheadedness, blurry vision follow 3-4 episodes of diarrhea. Cardiology is consulted for possible HTN emergency v type I NSTEMI given wall motion abnormalities on new echo. Troponins mildly elevated but flat at 0.450 --> 0.527 --> 0.479She was started on ACS protocol. She denies chest discomfort and denies past history of chest pain. On exam /103, patient receiving IV labetalol.     Suggested T wave abnormalities on EKG  unable to be compared due to variable lead placement.     Our recommendations are as follows:  - Rise in troponin likely due to elevated blood pressure with patient free of chest discomfort, favor hypertensive emergency as likely diagnosis.  - Optimize blood pressure control   - Possible referral to digital hypertension management  - No further management or work-up indicated    Thank you for your consult. Cardiology will sign off on this patient. Please call with any questions.

## 2023-08-18 NOTE — CONSULTS
[facility]  Cardiology  Consult Note    Patient Name: Beatrice Shankar  MRN: [id number]  Admission Date: 8/17/2023  Hospital Length of Stay: 1 days  Code Status: Full Code   Attending Provider: Yaya Fay MD   Consulting Provider: Alon Mott MD  Primary Care Physician: Sena Bales MD  Principal Problem:NSTEMI (non-ST elevated myocardial infarction)    Patient information was obtained from patient, past medical records and ER records.     Inpatient consult to Cardiology  Consult performed by: Alon Mott MD  Consult ordered by: Yaya Fay MD        Subjective:     Chief Complaint:  Diarrhea, lightheaded      HPI:   This is a 78-year-old woman with a past medical history of Diabetes mellitus Type 2, GERD, hyperlipidemia, cataracts, and hypertension who presented to the ED with lightheadedness, blurry vision follow 3-4 episodes of diarrhea. SHe says she had the same symptoms 8 days ago. Her blood pressure on admission was 168/78. She self reports that it was 178/120 at home prior to admission. On admission to Hospital Medicine team she had a BP of 210/110 and was given labetalol. Cardiology is consulted for possible HTN emergency v type I NSTEMI given wall motion abnormalities on new echo. She was started on ACS protocol. She denies chest discomfort and denies past history of chest pain. She says she has an esophageal ulcer that recently started causing heartburn after eating over the past two weeks but the last episode was Tuesday and resolved with PPI. She denies dyspnea on exertion, shortness of breath, palpitations, and orthopnea.     Notable labs: 0.450 --> 0.527 --> 0.479    Cardiology Procedures:  ECHO 8/18/23: Estimated ejection fraction of 50-55%. There is normal diastolic function. Left atrium is severly dilated. Regional wall motion abnormalities present in left ventricle. Moderate aortic valve sclerosis with mild annular calcification and mild aortic  regurgitation.  The following segments are hypokinetic: basal inferolateral, mid anteroseptal, mid inferoseptal and apical septal.    Stress test 2021: Normal systolic function. Study negative for myocardial ischemia.       Past Medical History:   Diagnosis Date    Anxiety     Atherosclerotic peripheral vascular disease     Diabetes mellitus, type 2     Gastroesophageal reflux disease     Hypercholesterolemia     Hypertension        Past Surgical History:   Procedure Laterality Date    APPENDECTOMY      BREAST BIOPSY Left     CATARACT EXTRACTION       SECTION      TUBAL LIGATION         Review of patient's allergies indicates:   Allergen Reactions    Codeine Nausea And Vomiting    Pneumoc 13-frida conj-dip cr(pf) Other (See Comments) and Shortness Of Breath     Asthma    Pneumococcal 23-frida ps vaccine Other (See Comments) and Shortness Of Breath     Asthuma    Erythromycin     Mycinette [cetylpyridinium-benzocaine]     Penicillins     Sulfur     Tetracyclines     Influenza virus vaccines Hives and Rash       No current facility-administered medications on file prior to encounter.     Current Outpatient Medications on File Prior to Encounter   Medication Sig    ascorbic acid, vitamin C, (VITAMIN C) 500 MG tablet Take 500 mg by mouth once daily.    aspirin (ECOTRIN) 81 MG EC tablet Take 81 mg by mouth once daily.    atorvastatin (LIPITOR) 20 MG tablet Take 20 mg by mouth.    blood sugar diagnostic Strp CHECK BLOOD SUGAR THREE TIMES A DAY    carvediloL (COREG) 6.25 MG tablet Take 6.25 mg by mouth 2 (two) times daily.    ergocalciferol, vitamin D2, (VITAMIN D ORAL) Take by mouth.    glipiZIDE (GLUCOTROL) 2.5 MG TR24 Take 2.5 mg by mouth.    olmesartan (BENICAR) 40 MG tablet Take 40 mg by mouth once daily.    omeprazole (PRILOSEC) 20 MG capsule Take 20 mg by mouth.    paroxetine (PAXIL) 10 MG tablet TAKE 1 TABLET BY MOUTH EVERY DAY     Family History       Problem Relation (Age of Onset)    Cancer Mother     "Diabetes Mother, Father, Brother    Heart attack Brother    Heart disease Father, Brother    Hypertension Mother, Father    Stroke Brother          Tobacco Use    Smoking status: Never    Smokeless tobacco: Never   Substance and Sexual Activity    Alcohol use: Yes     Comment: "Maybe at [place]."    Drug use: No    Sexual activity: Never     Review of Systems   Constitutional: Negative for chills, diaphoresis and fever.   Cardiovascular:  Positive for near-syncope (prior to admission). Negative for chest pain, claudication, dyspnea on exertion, irregular heartbeat, leg swelling, orthopnea, palpitations and syncope.   Gastrointestinal:  Positive for diarrhea. Negative for abdominal pain, constipation, nausea and vomiting.   Neurological:  Positive for headaches. Negative for dizziness.     Objective:     Vital Signs (Most Recent):  Temp: 97.1 °F (36.2 °C) (08/18/23 1628)  Pulse: 66 (08/18/23 1725)  Resp: 18 (08/18/23 1628)  BP: (!) 144/65 (08/18/23 1725)  SpO2: 97 % (08/18/23 1628) Vital Signs (24h Range):  Temp:  [97.1 °F (36.2 °C)-99.2 °F (37.3 °C)] 97.1 °F (36.2 °C)  Pulse:  [55-78] 66  Resp:  [12-18] 18  SpO2:  [94 %-100 %] 97 %  BP: (140-187)/(63-87) 144/65     Weight: 88.5 kg (195 lb)  Body mass index is 33.47 kg/m².    SpO2: 97 %       No intake or output data in the 24 hours ending 08/18/23 1743    Lines/Drains/Airways       Peripheral Intravenous Line  Duration                  Peripheral IV - Single Lumen 08/17/23 2016 Anterior;Left;Proximal Forearm <1 day                     Physical Exam  Vitals and nursing note reviewed.   Constitutional:       Appearance: Normal appearance. She is obese.   HENT:      Head: Normocephalic and atraumatic.   Eyes:      General: No scleral icterus.     Extraocular Movements: Extraocular movements intact.      Conjunctiva/sclera: Conjunctivae normal.   Cardiovascular:      Rate and Rhythm: Normal rate and regular rhythm.      Pulses: Normal pulses.      Heart sounds: Normal " heart sounds.   Pulmonary:      Effort: Pulmonary effort is normal. No respiratory distress.      Breath sounds: No wheezing or rales.   Abdominal:      Palpations: Abdomen is soft.      Tenderness: There is no abdominal tenderness.   Musculoskeletal:      Cervical back: Normal range of motion and neck supple.      Right lower leg: No edema.      Left lower leg: No edema.   Skin:     General: Skin is warm and dry.   Neurological:      Mental Status: She is alert and oriented to person, place, and time.   Psychiatric:         Mood and Affect: Mood normal.          Significant Labs: CMP   Recent Labs   Lab 08/17/23 2116 08/18/23  0436    140   K 4.0 3.6    108   CO2 23 25   * 108   BUN 9 8   CREATININE 0.8 0.8   CALCIUM 9.0 8.6*   PROT 7.0 6.3   ALBUMIN 3.6 3.3*   BILITOT 0.5 0.4   ALKPHOS 59 57   AST 22 19   ALT 17 13   ANIONGAP 11 7*   , CBC   Recent Labs   Lab 08/17/23 2116 08/18/23  0037 08/18/23  0436   WBC 9.05 7.71 7.96   HGB 13.4 12.4 12.3   HCT 41.4 37.2 38.0    192 196   , and Troponin   Recent Labs   Lab 08/17/23  2246 08/18/23  0131 08/18/23  0436   TROPONINI 0.450* 0.527* 0.479*         Assessment and Plan:     * NSTEMI (non-ST elevated myocardial infarction)  This is a 78-year-old woman with a past medical history of Diabetes mellitus Type 2, GERD, hyperlipidemia, hypertension who presented to the ED with lightheadedness, blurry vision follow 3-4 episodes of diarrhea. Cardiology is consulted for possible HTN emergency v type I NSTEMI given wall motion abnormalities on new echo. Troponins mildly elevated but flat at 0.450 --> 0.527 --> 0.479She was started on ACS protocol. She denies chest discomfort and denies past history of chest pain. On exam /103, patient receiving IV labetalol.     Suggested T wave abnormalities on EKG  unable to be compared due to variable lead placement.     Our recommendations are as follows:  - Rise in troponin likely due to elevated blood  pressure with patient free of chest discomfort, favor hypertensive emergency as likely diagnosis.  - Optimize blood pressure control   - Possible referral to digital hypertension management  - No further management or work-up indicated    Thank you for your consult. Cardiology will sign off on this patient. Please call with any questions.         VTE Risk Mitigation (From admission, onward)           Ordered     heparin 25,000 units in dextrose 5% (100 units/ml) IV bolus from bag - ADDITIONAL PRN BOLUS - 60 units/kg (max bolus 4000 units)  As needed (PRN)        Question:  Heparin Infusion Adjustment (DO NOT MODIFY ANSWER)  Answer:  [email or web address]\epic\Images\Pharmacy\HeparinInfusions\heparin LOW INTENSITY nomogram for [facility] [id number]    08/17/23 2250     heparin 25,000 units in dextrose 5% (100 units/ml) IV bolus from bag - ADDITIONAL PRN BOLUS - 30 units/kg (max bolus 4000 units)  As needed (PRN)        Question:  Heparin Infusion Adjustment (DO NOT MODIFY ANSWER)  Answer:  [email or web address]\epic\Images\Pharmacy\HeparinInfusions\heparin LOW INTENSITY nomogram for [facility] [id number]    08/17/23 2250     heparin 25,000 units in dextrose 5% 250 mL (100 units/mL) infusion LOW INTENSITY nomogram - [facility]  Continuous        Question Answer Comment   Heparin Infusion Adjustment (DO NOT MODIFY ANSWER) [email or web address]\epic\Images\Pharmacy\HeparinInfusions\heparin LOW INTENSITY nomogram for [facility] [id number]    Begin at (in units/kg/hr) 12        08/17/23 2250     Reason for No Pharmacological VTE Prophylaxis  Once        Question:  Reasons:  Answer:  IV Heparin w/in 24 hrs. Pre or Post-Op    08/17/23 2311     IP VTE HIGH RISK PATIENT  Once         08/17/23 2311     Place sequential compression device  Until discontinued         08/17/23 2311                    Thank you for your consult. I will sign off. Please contact us if you have any additional questions.    Alon Mott MD  Cardiology   [facility]

## 2023-08-18 NOTE — ED TRIAGE NOTES
C/o diarrhea x4 today. States he also had an episode of lightheadedness when she felt like the was going to pass out around 1700 today. Family at bedside. Denies fever, denies vomiting. States she also had an episode of  lightheadedness a week ago.

## 2023-08-19 LAB
ALBUMIN SERPL BCP-MCNC: 3.4 G/DL (ref 3.5–5.2)
ALP SERPL-CCNC: 55 U/L (ref 55–135)
ALT SERPL W/O P-5'-P-CCNC: 14 U/L (ref 10–44)
ANION GAP SERPL CALC-SCNC: 8 MMOL/L (ref 8–16)
AST SERPL-CCNC: 16 U/L (ref 10–40)
BILIRUB SERPL-MCNC: 0.6 MG/DL (ref 0.1–1)
BUN SERPL-MCNC: 8 MG/DL (ref 8–23)
CALCIUM SERPL-MCNC: 8.9 MG/DL (ref 8.7–10.5)
CHLORIDE SERPL-SCNC: 107 MMOL/L (ref 95–110)
CO2 SERPL-SCNC: 24 MMOL/L (ref 23–29)
CREAT SERPL-MCNC: 0.8 MG/DL (ref 0.5–1.4)
EST. GFR  (NO RACE VARIABLE): >60 ML/MIN/1.73 M^2
GLUCOSE SERPL-MCNC: 125 MG/DL (ref 70–110)
MAGNESIUM SERPL-MCNC: 1.8 MG/DL (ref 1.6–2.6)
PHOSPHATE SERPL-MCNC: 3.5 MG/DL (ref 2.7–4.5)
POCT GLUCOSE: 158 MG/DL (ref 70–110)
POTASSIUM SERPL-SCNC: 4 MMOL/L (ref 3.5–5.1)
PROT SERPL-MCNC: 6.2 G/DL (ref 6–8.4)
SODIUM SERPL-SCNC: 139 MMOL/L (ref 136–145)

## 2023-08-19 PROCEDURE — 84100 ASSAY OF PHOSPHORUS: CPT | Performed by: STUDENT IN AN ORGANIZED HEALTH CARE EDUCATION/TRAINING PROGRAM

## 2023-08-19 PROCEDURE — 99239 PR HOSPITAL DISCHARGE DAY,>30 MIN: ICD-10-PCS | Mod: ,,, | Performed by: STUDENT IN AN ORGANIZED HEALTH CARE EDUCATION/TRAINING PROGRAM

## 2023-08-19 PROCEDURE — 25000003 PHARM REV CODE 250: Performed by: STUDENT IN AN ORGANIZED HEALTH CARE EDUCATION/TRAINING PROGRAM

## 2023-08-19 PROCEDURE — 36415 COLL VENOUS BLD VENIPUNCTURE: CPT | Performed by: STUDENT IN AN ORGANIZED HEALTH CARE EDUCATION/TRAINING PROGRAM

## 2023-08-19 PROCEDURE — 99239 HOSP IP/OBS DSCHRG MGMT >30: CPT | Mod: ,,, | Performed by: STUDENT IN AN ORGANIZED HEALTH CARE EDUCATION/TRAINING PROGRAM

## 2023-08-19 PROCEDURE — 80053 COMPREHEN METABOLIC PANEL: CPT | Performed by: STUDENT IN AN ORGANIZED HEALTH CARE EDUCATION/TRAINING PROGRAM

## 2023-08-19 PROCEDURE — 83735 ASSAY OF MAGNESIUM: CPT | Performed by: STUDENT IN AN ORGANIZED HEALTH CARE EDUCATION/TRAINING PROGRAM

## 2023-08-19 RX ORDER — CARVEDILOL 12.5 MG/1
12.5 TABLET ORAL 2 TIMES DAILY
Qty: 60 TABLET | Refills: 11 | Status: SHIPPED | OUTPATIENT
Start: 2023-08-19 | End: 2023-08-19 | Stop reason: SDUPTHER

## 2023-08-19 RX ORDER — HYDROCHLOROTHIAZIDE 25 MG/1
25 TABLET ORAL DAILY
Qty: 30 TABLET | Refills: 11 | Status: SHIPPED | OUTPATIENT
Start: 2023-08-19 | End: 2023-08-24

## 2023-08-19 RX ORDER — CARVEDILOL 12.5 MG/1
25 TABLET ORAL 2 TIMES DAILY
Qty: 120 TABLET | Refills: 11 | Status: SHIPPED | OUTPATIENT
Start: 2023-08-19 | End: 2024-08-18

## 2023-08-19 RX ORDER — CARVEDILOL 12.5 MG/1
12.5 TABLET ORAL 2 TIMES DAILY
Status: DISCONTINUED | OUTPATIENT
Start: 2023-08-19 | End: 2023-08-19 | Stop reason: HOSPADM

## 2023-08-19 RX ORDER — HYDROCHLOROTHIAZIDE 12.5 MG/1
25 TABLET ORAL DAILY
Status: DISCONTINUED | OUTPATIENT
Start: 2023-08-19 | End: 2023-08-19 | Stop reason: HOSPADM

## 2023-08-19 RX ADMIN — VALSARTAN 320 MG: 160 TABLET, FILM COATED ORAL at 09:08

## 2023-08-19 RX ADMIN — HYDROCHLOROTHIAZIDE 25 MG: 12.5 TABLET ORAL at 03:08

## 2023-08-19 RX ADMIN — ATORVASTATIN CALCIUM 80 MG: 20 TABLET, FILM COATED ORAL at 09:08

## 2023-08-19 RX ADMIN — CARVEDILOL 12.5 MG: 12.5 TABLET, FILM COATED ORAL at 09:08

## 2023-08-19 RX ADMIN — ASPIRIN 81 MG: 81 TABLET, COATED ORAL at 09:08

## 2023-08-19 NOTE — DISCHARGE SUMMARY
Discharge Summary  Hospital Medicine  [facility]      Attending Physician on Discharge: Yaya Fay MD  Hospital Medicine Team: [facility] MED C  Date of Admission:  8/17/2023     Date of Discharge:  08/19/2023   Code status: Full Code    Active Hospital Problems    Diagnosis  POA    *NSTEMI (non-ST elevated myocardial infarction) [I21.4]  Yes    Atherosclerotic peripheral vascular disease [I70.209]  Yes    Diabetes mellitus, type 2 [E11.9]  Yes    Hypercholesterolemia [E78.00]  Yes    Hypertensive emergency, no CHF [I16.1]  Yes    Essential hypertension [I10]  Yes      Resolved Hospital Problems   No resolved problems to display.       Consults: Cardiology     History of Present Illness:  Patient is a 78-year-old female with past medical history significant for T2 dm/hypertension/hyperlipidemia/PID who presented after feeling lightheaded approximately 4 hours prior to presentation.  She states that she had an episode of lightheadedness as well as abnormal vision/visual disturbances which arose in her left visual field and cut across her vision.  She is never had any previous episode similar to this and she is been otherwise asymptomatic with the exception of 3 episodes of nonbloody diarrhea prior to present.  She called her daughter regarding her symptoms because she felt that something was very wrong and subsequently called 911 due to concern for a stroke.  Symptoms have resolved since arriving to the emergency department however she was subsequently found to be in hypertensive emergency with elevated troponins and evidence of T-wave inversion/ST segment depression on EKG.  She was admitted to hospital medicine following initiation of ACS protocol and her blood pressure on my assessment was greater than 210/110.  P.r.n. labetalol was ordered.  She denies any cardiac symptoms including any chest pain/pressure, shortness of breath, palpitations, abdominal pain, vomiting, although she has  had some nausea since she arrived.  Her visual disturbances have completely resolved.  She denies any fevers, chills, night sweats, or ongoing abnormal sensations.  She did not have any weakness of any peripheral limb nor is there evidence of facial droop. History is collected from the patient as well as her daughter who assists in completing the history of presenting complaint. She states that she had an angiogram in the past but is unsure if this was for her PAD or CAD and does not know the results or why she sees a cardiologist.              Overview/Hospital Course:  Trop peaked at 0.5, TTE with new WMAs, cardiology consulted recommended no antiplatelet therapy, improved BP control. Patient was started on HCTZ 25mg at dc and coreg was increased from 12.5mg BID to 25mg BID. She was referred to cardiology on discharge.      Laboratory Values:  Recent Labs   Lab 08/17/23 2116 08/18/23 0037 08/18/23 0436   WBC 9.05 7.71 7.96   HGB 13.4 12.4 12.3   HCT 41.4 37.2 38.0    192 196     Recent Labs   Lab 08/17/23 2116 08/18/23 0436 08/19/23  0623    140 139   K 4.0 3.6 4.0    108 107   CO2 23 25 24   BUN 9 8 8   CREATININE 0.8 0.8 0.8   * 108 125*   CALCIUM 9.0 8.6* 8.9   MG 1.9 1.9 1.8   PHOS 2.8 3.2 3.5     Recent Labs   Lab 08/17/23 2116 08/18/23 0037 08/18/23 0436 08/19/23  0623   ALKPHOS 59  --  57 55   ALT 17  --  13 14   AST 22  --  19 16   ALBUMIN 3.6  --  3.3* 3.4*   PROT 7.0  --  6.3 6.2   BILITOT 0.5  --  0.4 0.6   INR  --  1.0  --   --       Recent Labs   Lab 08/17/23 2056 08/18/23 0131 08/18/23  0849 08/18/23  1646 08/19/23  0919   POCTGLUCOSE 142* 124* 99 106 158*     Recent Labs     08/17/23  2246 08/18/23  0131 08/18/23  0436   TROPONINI 0.450* 0.527* 0.479*         Microbiology:  Microbiology Results (last 7 days)       ** No results found for the last 168 hours. **            Imaging:  Imaging Results              X-Ray Chest AP Portable (Final result)  Result time  08/17/23 21:33:29      Final result by Oliver Oliveira MD (08/17/23 21:33:29)                   Impression:      No convincing radiographic evidence of acute intrathoracic process on this single view.      Electronically signed by: Oliver Oliveira MD  Date:    08/17/2023  Time:    21:33               Narrative:    EXAMINATION:  XR CHEST AP PORTABLE    CLINICAL HISTORY:  Syncope and collapse    TECHNIQUE:  Single frontal view of the chest was performed.    COMPARISON:  09/24/2020    FINDINGS:  Cardiac monitoring leads overlie the chest.  Cardiac silhouette is stable in size.  Lungs are symmetrically expanded with mild coarse interstitial attenuation.  No large confluent airspace consolidation appreciated.  No significant volume of pleural fluid or pneumothorax identified.  Visualized osseous structures demonstrate mild degenerative changes.                                          Cardiac:  ECG Results              Repeat EKG 12-lead (Final result)  Result time 08/18/23 21:47:11      Final result by Interface, Lab In [facility] (08/18/23 21:47:11)                   Narrative:    Test Reason : R77.8,    Vent. Rate : 071 BPM     Atrial Rate : 071 BPM     P-R Int : 194 ms          QRS Dur : 104 ms      QT Int : 430 ms       P-R-T Axes : 048 041 098 degrees     QTc Int : 467 ms    Normal sinus rhythm  ST and T wave abnormality, consider anterior ischemia  Abnormal ECG  When compared with ECG of 17-AUG-2023 21:13,  Nonspecific T wave abnormality now evident in Inferior leads  Confirmed by Dilip FLORES MD ([id number]) on 8/18/2023 9:47:04 PM    Referred By: [email or web address]   SELF           Confirmed By:Dilip FLORES MD                                     EKG 12-lead (Final result)  Result time 08/18/23 21:48:46      Final result by Interface, Lab In [facility] (08/18/23 21:48:46)                   Narrative:    Test Reason : R55,    Vent. Rate : 072 BPM     Atrial Rate : 072 BPM     P-R Int : 188 ms          QRS Dur : 104 ms      QT Int :  396 ms       P-R-T Axes : 039 042 064 degrees     QTc Int : 433 ms    Normal sinus rhythm  T wave abnormality, consider anterior ischemia  Abnormal ECG  When compared with ECG of 24-SEP-2020 09:34,  T wave inversion no longer evident in Inferior leads  T wave inversion now evident in Anterior leads  Confirmed by Dilip FLORES MD ([id number]) on 8/18/2023 9:48:40 PM    Referred By: [email or web address]   SELF           Confirmed By:Dilip FLORES MD                                    Results for orders placed during the hospital encounter of 08/17/23    Echo    Interpretation Summary    Left Ventricle: The left ventricle is mildly dilated. Increased ventricular mass. Normal wall thickness. There is mild eccentric hypertrophy. regional wall motion abnormalities present. There is low normal systolic function with a visually estimated ejection fraction of 50 - 55%. There is normal diastolic function.    Left Atrium: Left atrium is severely dilated.    Right Ventricle: Normal right ventricular cavity size. Wall thickness is normal. Right ventricle wall motion  is normal. Systolic function is normal.    Right Atrium: Right atrium is mildly dilated.    Aortic Valve: The aortic valve is a trileaflet valve. There is moderate aortic valve sclerosis. Mild annular calcification. There is mild aortic regurgitation.    Mitral Valve: Mild mitral annular calcification.    Pulmonary Artery: No pulmonary hypertension. The estimated pulmonary artery systolic pressure is 33 mmHg.    IVC/SVC: Intermediate venous pressure at 8 mmHg.        Procedures:  * No surgery found *        Current Discharge Medication List        START taking these medications    Details   hydroCHLOROthiazide (HYDRODIURIL) 25 MG tablet Take 1 tablet (25 mg total) by mouth once daily.  Qty: 30 tablet, Refills: 11    Comments: .           CONTINUE these medications which have CHANGED    Details   carvediloL (COREG) 12.5 MG tablet Take 2 tablets (25 mg total) by mouth 2 (two) times  daily.  Qty: 120 tablet, Refills: 11    Comments: .           CONTINUE these medications which have NOT CHANGED    Details   ascorbic acid, vitamin C, (VITAMIN C) 500 MG tablet Take 500 mg by mouth once daily.      aspirin (ECOTRIN) 81 MG EC tablet Take 81 mg by mouth once daily.      atorvastatin (LIPITOR) 20 MG tablet Take 20 mg by mouth.      blood sugar diagnostic Strp CHECK BLOOD SUGAR THREE TIMES A DAY      ergocalciferol, vitamin D2, (VITAMIN D ORAL) Take by mouth.      glipiZIDE (GLUCOTROL) 2.5 MG TR24 Take 2.5 mg by mouth.      olmesartan (BENICAR) 40 MG tablet Take 40 mg by mouth once daily.      omeprazole (PRILOSEC) 20 MG capsule Take 20 mg by mouth.      paroxetine (PAXIL) 10 MG tablet TAKE 1 TABLET BY MOUTH EVERY DAY               Discharge Diet:cardiac diet with Normal Fluid intake of 1500 - 2000 mL per day    Activity: activity as tolerated    Discharge Condition: Good    Disposition: Home or Self Care    Follow up:     Follow-up Information       Sena Bales MD. Schedule an appointment as soon as possible for a visit .    Specialty: Internal Medicine  Why: Due to Pcp Office closed appointment will get made monday  Contact information:  [address] [place] [address]  [phone]                             Tests pending at the time of discharge: none        Time spent  on the discharge of the patient including review of hospital course with the patient. reviewing discharge medications and arranging follow-up care: >30 mins    Discharge examination: Patient was seen and examined on the date of discharge and determined to be suitable for discharge.        Yaya Fay M.D.  Department of Hospital Medicine  [facility]

## 2023-08-19 NOTE — PLAN OF CARE
[facility] - Cardiology Stepdown  Discharge Final Note    Primary Care Provider: Sena Bales MD    Expected Discharge Date: 8/19/2023    Final Discharge Note (most recent)       Final Note - 08/19/23 1027          Final Note    Assessment Type Final Discharge Note (P)      Anticipated Discharge Disposition Home or Self Care (P)      Hospital Resources/Appts/Education Provided Provided patient/caregiver with written discharge plan information;Provided education on problems/symptoms using teachback;Appointments scheduled and added to AVS (P)         Post-Acute Status    Discharge Delays None known at this time (P)                      Important Message from Medicare  Important Message from Medicare regarding Discharge Appeal Rights: Given to patient/caregiver, Explained to patient/caregiver, Signed/date by patient/caregiver     Date IMM was signed: 08/18/23  Time IMM was signed: 1323    Contact Info       Sena Bales MD   Specialty: Internal Medicine   Relationship: PCP - General    [address] [place] [address]   Phone: [phone]       Next Steps: Schedule an appointment as soon as possible for a visit    Instructions: Due to Pcp Office closed appointment will get made monday          Pt. discharging to home. SW met with pt. And her son at bedside. Pt. awaiting bedside medication delivery. Family to provide transportation home upon discharge. No discharge needs identified at this time.     Jane Vo LMSW

## 2023-08-19 NOTE — PLAN OF CARE
Due to PCP office closed appointment will be made Monday            Beatrice Vo CHW  Case Management   [phone]

## 2023-08-19 NOTE — PLAN OF CARE
AAOX4,VSS,O2 sats>95% on Room air .Plan of care discussed with patient.Patient has no complaints of pain/SOB. Discussed medications and care. Patient has no questions at this time.Pt visualised and stable.Call light within reach.Pt resting,bed at lowest position.Pt's family by the bedside.Will continue to monitor through the shift.          Problem: Adult Inpatient Plan of Care  Goal: Plan of Care Review  8/18/2023 1903 by Emilee Perez RN  Outcome: Ongoing, Progressing  8/18/2023 1903 by Emilee Perez RN  Reactivated  8/18/2023 1842 by Emilee Perez RN  Outcome: Met  8/18/2023 1841 by Emilee Perez RN  Outcome: Ongoing, Not Progressing  Goal: Patient-Specific Goal (Individualized)  8/18/2023 1903 by Emilee Perez RN  Outcome: Ongoing, Progressing  8/18/2023 1903 by Emilee Perez RN  Reactivated  8/18/2023 1842 by Emilee Perez RN  Outcome: Met  8/18/2023 1841 by Emilee Perez RN  Outcome: Ongoing, Not Progressing  Goal: Absence of Hospital-Acquired Illness or Injury  8/18/2023 1903 by Emilee Perez RN  Outcome: Ongoing, Progressing  8/18/2023 1903 by Emilee Perez, RN  Reactivated  8/18/2023 1842 by Emilee Perez RN  Outcome: Met  8/18/2023 1841 by Emilee Perez RN  Outcome: Ongoing, Not Progressing  Goal: Optimal Comfort and Wellbeing  8/18/2023 1903 by Emilee Perez RN  Outcome: Ongoing, Progressing  8/18/2023 1903 by Emilee Perez RN  Reactivated  8/18/2023 1842 by Emilee Perez RN  Outcome: Met  8/18/2023 1841 by Emilee Perez RN  Outcome: Ongoing, Not Progressing  Goal: Readiness for Transition of Care  8/18/2023 1903 by Emilee Perez RN  Outcome: Ongoing, Progressing  8/18/2023 1903 by Emilee Perez, RN  Reactivated  8/18/2023 1842 by Emilee Perez, RN  Outcome: Met  8/18/2023 1841 by Emilee Perez, RN  Outcome: Ongoing, Not  Progressing     Problem: Diabetes Comorbidity  Goal: Blood Glucose Level Within Targeted Range  8/18/2023 1903 by Emilee Perez, RN  Outcome: Ongoing, Progressing  8/18/2023 1903 by Emilee Perez, RN  Reactivated  8/18/2023 1842 by Emilee Perez RN  Outcome: Met  8/18/2023 1841 by Emilee Perez RN  Outcome: Ongoing, Not Progressing     Problem: Fall Injury Risk  Goal: Absence of Fall and Fall-Related Injury  8/18/2023 1903 by Emilee Perez, RN  Outcome: Ongoing, Progressing  8/18/2023 1903 by Emilee Perez, RN  Reactivated  8/18/2023 1842 by Emilee Perez RN  Outcome: Met  8/18/2023 1841 by Emilee Perez RN  Outcome: Ongoing, Not Progressing

## 2023-08-19 NOTE — DISCHARGE INSTRUCTIONS
Dear Beatrice Braxton,    You were admitted due to very high blood pressure, we have adjusted your blood pressure medications as listed above and you should also follow a low salt diet. We have also referred you to a cardiologist due to his high blood pressure

## 2023-08-19 NOTE — PLAN OF CARE
Pt discharged per MD orders.  Tele discontinued and returned to station.  IV discontinued; catheter tip intact x.  Medication list and prescriptions reviewed; prescriptions sent to pt preferred pharmacy and printed prescriptions provided.  Pt verbalizes understanding of all written and verbal discharge instructions.  Pt awaiting family/escort arrival.  Will continue to monitor.      Problem: Adult Inpatient Plan of Care  Goal: Plan of Care Review  Outcome: Met  Goal: Patient-Specific Goal (Individualized)  Outcome: Met  Goal: Absence of Hospital-Acquired Illness or Injury  Outcome: Met  Goal: Optimal Comfort and Wellbeing  Outcome: Met  Goal: Readiness for Transition of Care  Outcome: Met     Problem: Diabetes Comorbidity  Goal: Blood Glucose Level Within Targeted Range  Outcome: Met     Problem: Fall Injury Risk  Goal: Absence of Fall and Fall-Related Injury  Outcome: Met

## 2023-08-21 NOTE — PLAN OF CARE
Ilene will be sending to PCP due to no opening not until October          Beatrice Vo CHW  Case Management   [phone]

## 2023-08-24 ENCOUNTER — OFFICE VISIT (OUTPATIENT)
Dept: CARDIOLOGY | Facility: CLINIC | Age: 78
End: 2023-08-24
Payer: MEDICARE

## 2023-08-24 VITALS
WEIGHT: 188.5 LBS | HEART RATE: 71 BPM | DIASTOLIC BLOOD PRESSURE: 60 MMHG | SYSTOLIC BLOOD PRESSURE: 118 MMHG | BODY MASS INDEX: 32.36 KG/M2

## 2023-08-24 DIAGNOSIS — I70.209 ATHEROSCLEROTIC PERIPHERAL VASCULAR DISEASE: ICD-10-CM

## 2023-08-24 DIAGNOSIS — I10 PRIMARY HYPERTENSION: ICD-10-CM

## 2023-08-24 DIAGNOSIS — I21.4 NSTEMI (NON-ST ELEVATED MYOCARDIAL INFARCTION): Primary | ICD-10-CM

## 2023-08-24 DIAGNOSIS — E78.00 HYPERCHOLESTEROLEMIA: ICD-10-CM

## 2023-08-24 PROCEDURE — 1159F PR MEDICATION LIST DOCUMENTED IN MEDICAL RECORD: ICD-10-PCS | Mod: CPTII,S$GLB,, | Performed by: INTERNAL MEDICINE

## 2023-08-24 PROCEDURE — 1111F PR DISCHARGE MEDS RECONCILED W/ CURRENT OUTPATIENT MED LIST: ICD-10-PCS | Mod: CPTII,S$GLB,, | Performed by: INTERNAL MEDICINE

## 2023-08-24 PROCEDURE — 1126F PR PAIN SEVERITY QUANTIFIED, NO PAIN PRESENT: ICD-10-PCS | Mod: CPTII,S$GLB,, | Performed by: INTERNAL MEDICINE

## 2023-08-24 PROCEDURE — 99215 PR OFFICE/OUTPT VISIT, EST, LEVL V, 40-54 MIN: ICD-10-PCS | Mod: S$GLB,,, | Performed by: INTERNAL MEDICINE

## 2023-08-24 PROCEDURE — 3074F PR MOST RECENT SYSTOLIC BLOOD PRESSURE < 130 MM HG: ICD-10-PCS | Mod: CPTII,S$GLB,, | Performed by: INTERNAL MEDICINE

## 2023-08-24 PROCEDURE — 1159F MED LIST DOCD IN RCRD: CPT | Mod: CPTII,S$GLB,, | Performed by: INTERNAL MEDICINE

## 2023-08-24 PROCEDURE — 1160F PR REVIEW ALL MEDS BY PRESCRIBER/CLIN PHARMACIST DOCUMENTED: ICD-10-PCS | Mod: CPTII,S$GLB,, | Performed by: INTERNAL MEDICINE

## 2023-08-24 PROCEDURE — 99215 OFFICE O/P EST HI 40 MIN: CPT | Mod: S$GLB,,, | Performed by: INTERNAL MEDICINE

## 2023-08-24 PROCEDURE — 3078F DIAST BP <80 MM HG: CPT | Mod: CPTII,S$GLB,, | Performed by: INTERNAL MEDICINE

## 2023-08-24 PROCEDURE — 1111F DSCHRG MED/CURRENT MED MERGE: CPT | Mod: CPTII,S$GLB,, | Performed by: INTERNAL MEDICINE

## 2023-08-24 PROCEDURE — 3288F FALL RISK ASSESSMENT DOCD: CPT | Mod: CPTII,S$GLB,, | Performed by: INTERNAL MEDICINE

## 2023-08-24 PROCEDURE — 99999 PR PBB SHADOW E&M-EST. PATIENT-LVL III: CPT | Mod: PBBFAC,,, | Performed by: INTERNAL MEDICINE

## 2023-08-24 PROCEDURE — 3288F PR FALLS RISK ASSESSMENT DOCUMENTED: ICD-10-PCS | Mod: CPTII,S$GLB,, | Performed by: INTERNAL MEDICINE

## 2023-08-24 PROCEDURE — 3074F SYST BP LT 130 MM HG: CPT | Mod: CPTII,S$GLB,, | Performed by: INTERNAL MEDICINE

## 2023-08-24 PROCEDURE — 1101F PT FALLS ASSESS-DOCD LE1/YR: CPT | Mod: CPTII,S$GLB,, | Performed by: INTERNAL MEDICINE

## 2023-08-24 PROCEDURE — 1101F PR PT FALLS ASSESS DOC 0-1 FALLS W/OUT INJ PAST YR: ICD-10-PCS | Mod: CPTII,S$GLB,, | Performed by: INTERNAL MEDICINE

## 2023-08-24 PROCEDURE — 1160F RVW MEDS BY RX/DR IN RCRD: CPT | Mod: CPTII,S$GLB,, | Performed by: INTERNAL MEDICINE

## 2023-08-24 PROCEDURE — 1126F AMNT PAIN NOTED NONE PRSNT: CPT | Mod: CPTII,S$GLB,, | Performed by: INTERNAL MEDICINE

## 2023-08-24 PROCEDURE — 3078F PR MOST RECENT DIASTOLIC BLOOD PRESSURE < 80 MM HG: ICD-10-PCS | Mod: CPTII,S$GLB,, | Performed by: INTERNAL MEDICINE

## 2023-08-24 PROCEDURE — 99999 PR PBB SHADOW E&M-EST. PATIENT-LVL III: ICD-10-PCS | Mod: PBBFAC,,, | Performed by: INTERNAL MEDICINE

## 2023-08-24 NOTE — PROGRESS NOTES
[facility]    Chief Complaint  Chief Complaint   Patient presents with    Heart Problem       HPI:    She went to the emergency room earlier this month because she was dizzy.  Blood pressure was high.  This led to cardiac workup including troponins which were minimally elevated with a flat curve.  Echocardiogram showed normal left ventricular contractility.  Cardiology was consulted and felt no further workup was indicated.  Carvedilol was increased to 25 mg twice daily.  At follow-up with Dr. Beckwith, she recommended decreasing it back to 12.5 mg twice daily.  Had another episode of dizziness this morning but not as severe.  Dizziness feels like a lightheadedness.  It is not postural.  No loss of consciousness.  Not worse if she is standing up.  No associated nausea, vomiting, dyspnea, or chest discomfort.    Medications  Current Outpatient Medications   Medication Sig Dispense Refill    ascorbic acid, vitamin C, (VITAMIN C) 500 MG tablet Take 500 mg by mouth once daily.      aspirin (ECOTRIN) 81 MG EC tablet Take 81 mg by mouth once daily.      atorvastatin (LIPITOR) 20 MG tablet Take 20 mg by mouth.      blood sugar diagnostic Strp CHECK BLOOD SUGAR THREE TIMES A DAY      carvediloL (COREG) 12.5 MG tablet Take 2 tablets (25 mg total) by mouth 2 (two) times daily. (Patient taking differently: Take 12.5 mg by mouth 2 (two) times daily.) 120 tablet 11    ergocalciferol, vitamin D2, (VITAMIN D ORAL) Take by mouth.      glipiZIDE (GLUCOTROL) 2.5 MG TR24 Take 2.5 mg by mouth.      olmesartan (BENICAR) 40 MG tablet Take 40 mg by mouth once daily.      omeprazole (PRILOSEC) 20 MG capsule Take 20 mg by mouth.      paroxetine (PAXIL) 10 MG tablet TAKE 1 TABLET BY MOUTH EVERY DAY       No current facility-administered medications for this visit.        History  Past Medical History:   Diagnosis Date    Anxiety     Atherosclerotic peripheral vascular disease     Diabetes mellitus, type 2     Gastroesophageal  "reflux disease     Hypercholesterolemia     Hypertension      Past Surgical History:   Procedure Laterality Date    APPENDECTOMY      BREAST BIOPSY Left     CATARACT EXTRACTION       SECTION      TUBAL LIGATION       Social History     Socioeconomic History    Marital status: Single   Tobacco Use    Smoking status: Never    Smokeless tobacco: Never   Substance and Sexual Activity    Alcohol use: Yes     Comment: "Maybe at [facility]."    Drug use: No    Sexual activity: Never     Social Determinants of Health     Financial Resource Strain: Low Risk  (2023)    Overall Financial Resource Strain (CARDIA)     Difficulty of Paying Living Expenses: Not very hard   Food Insecurity: No Food Insecurity (2023)    Hunger Vital Sign     Worried About Running Out of Food in the Last Year: Never true     Ran Out of Food in the Last Year: Never true   Transportation Needs: No Transportation Needs (2023)    PRAPARE - Transportation     Lack of Transportation (Medical): No     Lack of Transportation (Non-Medical): No   Social Connections: Unknown (2023)    Social Connection and Isolation Panel [NHANES]     Frequency of Communication with Friends and Family: More than three times a week     Frequency of Social Gatherings with Friends and Family: More than three times a week   Housing Stability: Unknown (2023)    Housing Stability Vital Sign     Unable to Pay for Housing in the Last Year: No     Unstable Housing in the Last Year: No     Family History   Problem Relation Age of Onset    Cancer Mother     Hypertension Mother     Diabetes Mother     Hypertension Father     Diabetes Father     Heart disease Father     Diabetes Brother     Heart disease Brother     Heart attack Brother     Stroke Brother         Allergies  Review of patient's allergies indicates:   Allergen Reactions    Codeine Nausea And Vomiting    Pneumoc 13-frida conj-dip cr(pf) Other (See Comments) and Shortness Of Breath     Asthma    " Pneumococcal 23-frida ps vaccine Other (See Comments) and Shortness Of Breath     Asthuma    Erythromycin     Mycinette [cetylpyridinium-benzocaine]     Penicillins     Sulfur     Tetracyclines     Influenza virus vaccines Hives and Rash       Review of Systems   Review of Systems   Constitutional: Negative for malaise/fatigue, weight gain and weight loss.   Eyes:  Negative for visual disturbance.   Cardiovascular:  Negative for chest pain, claudication, cyanosis, dyspnea on exertion, irregular heartbeat, leg swelling, near-syncope, orthopnea, palpitations, paroxysmal nocturnal dyspnea and syncope.   Respiratory:  Negative for cough, hemoptysis, shortness of breath, sleep disturbances due to breathing and wheezing.    Hematologic/Lymphatic: Negative for bleeding problem. Does not bruise/bleed easily.   Skin:  Negative for poor wound healing.   Musculoskeletal:  Negative for muscle cramps and myalgias.   Gastrointestinal:  Negative for abdominal pain, anorexia, diarrhea, heartburn, hematemesis, hematochezia, melena, nausea and vomiting.   Genitourinary:  Negative for hematuria and nocturia.   Neurological:  Positive for dizziness and light-headedness. Negative for excessive daytime sleepiness, focal weakness and weakness.       Physical Exam  Vitals:    08/24/23 1452   BP: 118/60   Pulse: 71     Wt Readings from Last 1 Encounters:   08/24/23 85.5 kg (188 lb 8 oz)     Physical Exam  Constitutional:       General: She is not in acute distress.     Appearance: She is not toxic-appearing or diaphoretic.   HENT:      Head: Normocephalic and atraumatic.   Eyes:      General: No scleral icterus.     Conjunctiva/sclera: Conjunctivae normal.   Neck:      Thyroid: No thyromegaly.      Vascular: No carotid bruit, hepatojugular reflux or JVD.      Trachea: No tracheal deviation.   Cardiovascular:      Rate and Rhythm: Normal rate and regular rhythm. No extrasystoles are present.     Chest Wall: PMI is not displaced.      Pulses:            Carotid pulses are 2+ on the right side and 2+ on the left side.       Radial pulses are 2+ on the right side and 2+ on the left side.        Dorsalis pedis pulses are 2+ on the right side and 2+ on the left side.        Posterior tibial pulses are 2+ on the right side and 2+ on the left side.      Heart sounds: S1 normal and S2 normal. No murmur heard.     No S3 or S4 sounds.   Pulmonary:      Effort: No accessory muscle usage or respiratory distress.      Breath sounds: No decreased breath sounds, wheezing, rhonchi or rales.   Abdominal:      General: Bowel sounds are normal. There is no abdominal bruit.      Palpations: Abdomen is soft. There is no hepatomegaly, splenomegaly or pulsatile mass.      Tenderness: There is no abdominal tenderness.   Musculoskeletal:         General: No tenderness or deformity.      Cervical back: Neck supple.   Skin:     General: Skin is warm and dry.      Coloration: Skin is not pale.      Nails: There is no clubbing.   Neurological:      Mental Status: She is alert and oriented to person, place, and time.      Cranial Nerves: No cranial nerve deficit.      Sensory: No sensory deficit.   Psychiatric:         Speech: Speech normal.         Behavior: Behavior normal. Behavior is cooperative.         Labs  No results displayed because visit has over 200 results.          Imaging  Echo    Result Date: 8/18/2023    Left Ventricle: The left ventricle is mildly dilated. Increased ventricular mass. Normal wall thickness. There is mild eccentric hypertrophy. regional wall motion abnormalities present. There is low normal systolic function with a visually estimated ejection fraction of 50 - 55%. There is normal diastolic function.   Left Atrium: Left atrium is severely dilated.   Right Ventricle: Normal right ventricular cavity size. Wall thickness is normal. Right ventricle wall motion  is normal. Systolic function is normal.   Right Atrium: Right atrium is mildly dilated.   Aortic  Valve: The aortic valve is a trileaflet valve. There is moderate aortic valve sclerosis. Mild annular calcification. There is mild aortic regurgitation.   Mitral Valve: Mild mitral annular calcification.   Pulmonary Artery: No pulmonary hypertension. The estimated pulmonary artery systolic pressure is 33 mmHg.   IVC/SVC: Intermediate venous pressure at 8 mmHg.     X-Ray Chest AP Portable    Result Date: 8/17/2023  EXAMINATION: XR CHEST AP PORTABLE CLINICAL HISTORY: Syncope and collapse TECHNIQUE: Single frontal view of the chest was performed. COMPARISON: 09/24/2020 FINDINGS: Cardiac monitoring leads overlie the chest.  Cardiac silhouette is stable in size.  Lungs are symmetrically expanded with mild coarse interstitial attenuation.  No large confluent airspace consolidation appreciated.  No significant volume of pleural fluid or pneumothorax identified.  Visualized osseous structures demonstrate mild degenerative changes.     No convincing radiographic evidence of acute intrathoracic process on this single view. Electronically signed by: Oliver Oliveira MD Date:    08/17/2023 Time:    21:33      Assessment  1. NSTEMI (non-ST elevated myocardial infarction)  Demand ischemia from elevated blood pressure    2. Atherosclerotic peripheral vascular disease  Remains asymptomatic    3. Primary hypertension  Controlled    4. Hypercholesterolemia  Controlled      Plan and Discussion    Unclear of dizziness was a manifestation of a hypertensive emergency.  At this point she is much improved.  Blood pressure is well controlled.  She is working on dietary sodium restriction.  Would continue with present management and follow.    The ASCVD Risk score (Benton DK, et al., 2019) failed to calculate for the following reasons:    The patient has a prior MI or stroke diagnosis     Follow Up  Follow up in about 3 months (around 11/24/2023).      Jh Mata MD

## 2023-08-26 ENCOUNTER — HOSPITAL ENCOUNTER (EMERGENCY)
Facility: HOSPITAL | Age: 78
Discharge: HOME | End: 2023-08-26
Attending: EMERGENCY MEDICINE
Payer: MEDICARE

## 2023-08-26 VITALS
WEIGHT: 187.38 LBS | HEIGHT: 64 IN | TEMPERATURE: 98 F | OXYGEN SATURATION: 98 % | BODY MASS INDEX: 31.99 KG/M2 | RESPIRATION RATE: 15 BRPM | HEART RATE: 70 BPM | DIASTOLIC BLOOD PRESSURE: 68 MMHG | SYSTOLIC BLOOD PRESSURE: 134 MMHG

## 2023-08-26 DIAGNOSIS — R53.83 FATIGUE: ICD-10-CM

## 2023-08-26 DIAGNOSIS — R55 PRE-SYNCOPE: Primary | ICD-10-CM

## 2023-08-26 LAB
ALBUMIN SERPL BCP-MCNC: 4.3 G/DL (ref 3.5–5.2)
ALP SERPL-CCNC: 63 U/L (ref 55–135)
ALT SERPL W/O P-5'-P-CCNC: 14 U/L (ref 10–44)
ANION GAP SERPL CALC-SCNC: 8 MMOL/L (ref 8–16)
AST SERPL-CCNC: 17 U/L (ref 10–40)
BASOPHILS # BLD AUTO: 0.05 K/UL (ref 0–0.2)
BASOPHILS NFR BLD: 0.6 % (ref 0–1.9)
BILIRUB SERPL-MCNC: 0.6 MG/DL (ref 0.1–1)
BILIRUB UR QL STRIP: NEGATIVE
BNP SERPL-MCNC: 43 PG/ML (ref 0–99)
BUN SERPL-MCNC: 24 MG/DL (ref 8–23)
CALCIUM SERPL-MCNC: 10 MG/DL (ref 8.7–10.5)
CHLORIDE SERPL-SCNC: 103 MMOL/L (ref 95–110)
CLARITY UR REFRACT.AUTO: CLEAR
CO2 SERPL-SCNC: 25 MMOL/L (ref 23–29)
COLOR UR AUTO: COLORLESS
CREAT SERPL-MCNC: 0.8 MG/DL (ref 0.5–1.4)
DIFFERENTIAL METHOD: NORMAL
EOSINOPHIL # BLD AUTO: 0.1 K/UL (ref 0–0.5)
EOSINOPHIL NFR BLD: 1.2 % (ref 0–8)
ERYTHROCYTE [DISTWIDTH] IN BLOOD BY AUTOMATED COUNT: 13.9 % (ref 11.5–14.5)
EST. GFR  (NO RACE VARIABLE): >60 ML/MIN/1.73 M^2
GLUCOSE SERPL-MCNC: 145 MG/DL (ref 70–110)
GLUCOSE UR QL STRIP: NEGATIVE
HCT VFR BLD AUTO: 45 % (ref 37–48.5)
HGB BLD-MCNC: 14.4 G/DL (ref 12–16)
HGB UR QL STRIP: NEGATIVE
IMM GRANULOCYTES # BLD AUTO: 0.02 K/UL (ref 0–0.04)
IMM GRANULOCYTES NFR BLD AUTO: 0.3 % (ref 0–0.5)
KETONES UR QL STRIP: NEGATIVE
LEUKOCYTE ESTERASE UR QL STRIP: NEGATIVE
LYMPHOCYTES # BLD AUTO: 2.1 K/UL (ref 1–4.8)
LYMPHOCYTES NFR BLD: 26.8 % (ref 18–48)
MAGNESIUM SERPL-MCNC: 2 MG/DL (ref 1.6–2.6)
MCH RBC QN AUTO: 27.9 PG (ref 27–31)
MCHC RBC AUTO-ENTMCNC: 32 G/DL (ref 32–36)
MCV RBC AUTO: 87 FL (ref 82–98)
MONOCYTES # BLD AUTO: 0.4 K/UL (ref 0.3–1)
MONOCYTES NFR BLD: 4.6 % (ref 4–15)
NEUTROPHILS # BLD AUTO: 5.2 K/UL (ref 1.8–7.7)
NEUTROPHILS NFR BLD: 66.5 % (ref 38–73)
NITRITE UR QL STRIP: NEGATIVE
NRBC BLD-RTO: 0 /100 WBC
PH UR STRIP: 7 [PH] (ref 5–8)
PLATELET # BLD AUTO: 222 K/UL (ref 150–450)
PMV BLD AUTO: 12.3 FL (ref 9.2–12.9)
POCT GLUCOSE: 139 MG/DL (ref 70–110)
POTASSIUM SERPL-SCNC: 4.4 MMOL/L (ref 3.5–5.1)
PROT SERPL-MCNC: 8.2 G/DL (ref 6–8.4)
PROT UR QL STRIP: NEGATIVE
RBC # BLD AUTO: 5.17 M/UL (ref 4–5.4)
SARS-COV-2 RDRP RESP QL NAA+PROBE: NEGATIVE
SODIUM SERPL-SCNC: 136 MMOL/L (ref 136–145)
SP GR UR STRIP: 1.01 (ref 1–1.03)
TROPONIN I SERPL DL<=0.01 NG/ML-MCNC: <0.006 NG/ML (ref 0–0.03)
TROPONIN I SERPL DL<=0.01 NG/ML-MCNC: <0.006 NG/ML (ref 0–0.03)
URN SPEC COLLECT METH UR: ABNORMAL
WBC # BLD AUTO: 7.77 K/UL (ref 3.9–12.7)

## 2023-08-26 PROCEDURE — 85025 COMPLETE CBC W/AUTO DIFF WBC: CPT

## 2023-08-26 PROCEDURE — 99285 EMERGENCY DEPT VISIT HI MDM: CPT | Mod: 25

## 2023-08-26 PROCEDURE — 84484 ASSAY OF TROPONIN QUANT: CPT | Mod: 91

## 2023-08-26 PROCEDURE — U0002 COVID-19 LAB TEST NON-CDC: HCPCS

## 2023-08-26 PROCEDURE — 83880 ASSAY OF NATRIURETIC PEPTIDE: CPT

## 2023-08-26 PROCEDURE — 83735 ASSAY OF MAGNESIUM: CPT

## 2023-08-26 PROCEDURE — 81003 URINALYSIS AUTO W/O SCOPE: CPT

## 2023-08-26 PROCEDURE — 93010 ELECTROCARDIOGRAM REPORT: CPT | Mod: ,,, | Performed by: INTERNAL MEDICINE

## 2023-08-26 PROCEDURE — 93010 EKG 12-LEAD: ICD-10-PCS | Mod: ,,, | Performed by: INTERNAL MEDICINE

## 2023-08-26 PROCEDURE — 93005 ELECTROCARDIOGRAM TRACING: CPT

## 2023-08-26 PROCEDURE — 84484 ASSAY OF TROPONIN QUANT: CPT

## 2023-08-26 PROCEDURE — 80053 COMPREHEN METABOLIC PANEL: CPT

## 2023-08-26 PROCEDURE — 82962 GLUCOSE BLOOD TEST: CPT

## 2023-08-26 NOTE — ED PROVIDER NOTES
Encounter Date: 8/26/2023       History     Chief Complaint   Patient presents with    Fatigue     Patient is 78-year-old female that presents to the emergency department today with her daughter after episode of lightheadedness.  Patient has past medical history of hypertension and diabetes.  Patient states that this is the 4th episode she has had of lightheadedness.  She describes getting up to eat this morning normal without any symptoms.  During her meal she felt like she was about to pass out.  Unable to describe exactly than sensation, but denied spinning, denied nausea, denied vomiting, denied chest pain or back pain.  But denied tunnel vision or ringing in her ears.  The episode self-resolved, however given that this is the 4th episode.   Patient denies recent sick contacts, denies any cough.  Denies weight change or weight loss.  Denies any weakness or persistent symptoms at this time.    Of note she was just admitted for hypertensive urgency she was a blood pressure of 200s in the ER with a troponin elevation.  She had an echocardiogram during that admission.  They discharged her with close follow up outpatient with Cardiology.     The history is provided by the patient, a relative and medical records. No  was used.     Review of patient's allergies indicates:   Allergen Reactions    Codeine Nausea And Vomiting    Pneumoc 13-frida conj-dip cr(pf) Other (See Comments) and Shortness Of Breath     Asthma    Pneumococcal 23-frida ps vaccine Other (See Comments) and Shortness Of Breath     Asthuma    Erythromycin     Mycinette [cetylpyridinium-benzocaine]     Penicillins     Sulfur     Tetracyclines     Influenza virus vaccines Hives and Rash     Past Medical History:   Diagnosis Date    Anxiety     Atherosclerotic peripheral vascular disease     Diabetes mellitus, type 2     Gastroesophageal reflux disease     Hypercholesterolemia     Hypertension      Past Surgical History:   Procedure  "Laterality Date    APPENDECTOMY      BREAST BIOPSY Left     CATARACT EXTRACTION       SECTION      TUBAL LIGATION       Family History   Problem Relation Age of Onset    Cancer Mother     Hypertension Mother     Diabetes Mother     Hypertension Father     Diabetes Father     Heart disease Father     Diabetes Brother     Heart disease Brother     Heart attack Brother     Stroke Brother      Social History     Tobacco Use    Smoking status: Never    Smokeless tobacco: Never   Substance Use Topics    Alcohol use: Yes     Comment: "Maybe at [place]."    Drug use: No         Physical Exam     Initial Vitals [23 1327]   BP Pulse Resp Temp SpO2   (!) 142/67 67 18 98.6 °F (37 °C) 96 %      MAP       --         Physical Exam  Gen: well appearing, no acute distress, following directions, A&Ox3   Skin: no diaphoresis, cyanosis, no rash on grossly inspected skin   HEENT: oral mucosa moist, oral cavity with out lesions, head non traumatic   CV: regular rate, normal rhythm, S1 and S2 appreciated, no extra heart sounds or murmurs  Pul: clear to auscultation in all lung fields, good respiratory effort, no other lung sounds appreciated   Abd: flat abdomen, no scars or lesions, soft, no abdominal masses, non-tender to palpation   Ext: Distal pulses +2 in B/L LE and UE, warm to the touch,   Neuro: Sensation to light touch intact, no focal deficit. MS 5/5 in B/L UE and LE. Cranial Nerves 2-12 grossly intact. Normal Finger to nose, normal gait (walked chair to bed on presentation)      ED Course   Procedures  Labs Reviewed   COMPREHENSIVE METABOLIC PANEL - Abnormal; Notable for the following components:       Result Value    Glucose 145 (*)     BUN 24 (*)     All other components within normal limits   URINALYSIS, REFLEX TO URINE CULTURE - Abnormal; Notable for the following components:    Color, UA Colorless (*)     All other components within normal limits    Narrative:     Specimen Source->Urine   POCT GLUCOSE - " Abnormal; Notable for the following components:    POCT Glucose 139 (*)     All other components within normal limits   CBC W/ AUTO DIFFERENTIAL   TROPONIN I   B-TYPE NATRIURETIC PEPTIDE   MAGNESIUM   SARS-COV-2 RNA AMPLIFICATION, QUAL   TROPONIN I     EKG Readings: (Independently Interpreted)   Initial Reading: No STEMI. Previous EKG: Compared with most recent EKG Previous EKG Date: 8/17/2023. Rhythm: Normal Sinus Rhythm. Heart Rate: 65. Ectopy: No Ectopy. Clinical Impression: Normal Sinus Rhythm   Nonspecific TWA, similar to prior      ECG Results              EKG 12-lead (Final result)  Result time 08/27/23 04:20:03      Final result by Interface, Lab In [facility] (08/27/23 04:20:03)                   Narrative:    Test Reason : R53.83,    Vent. Rate : 065 BPM     Atrial Rate : 065 BPM     P-R Int : 180 ms          QRS Dur : 092 ms      QT Int : 400 ms       P-R-T Axes : 033 027 026 degrees     QTc Int : 416 ms    Normal sinus rhythm  Nonspecific T wave abnormality  Abnormal ECG  When compared with ECG of 17-AUG-2023 22:41,  QT has shortened  Confirmed by Dilip FLORES MD ([id number]) on 8/27/2023 4:19:52 AM    Referred By: [email or web address]   SELF           Confirmed By:Dilip FLORES MD                                  Imaging Results              X-Ray Chest AP Portable (Final result)  Result time 08/26/23 16:29:35      Final result by Todd Dickey MD (08/26/23 16:29:35)                   Impression:      No acute chest disease identified.      Electronically signed by: Todd Dickey MD  Date:    08/26/2023  Time:    16:29               Narrative:    EXAMINATION:  XR CHEST AP PORTABLE    CLINICAL HISTORY:  Other fatigue    TECHNIQUE:  Single frontal view of the chest was performed.    COMPARISON:  08/17/2023.    FINDINGS:  The heart is not enlarged.  Superior mediastinal structures are unremarkable.  Pulmonary vasculature is within normal limits.  The lungs are free of focal consolidations.  There is no evidence for  pneumothorax or large pleural effusions.  Bony structures are grossly intact.                                       MRA Brain without contrast (Final result)  Result time 08/26/23 17:38:59   Procedure changed from MRA Brain with and without contrast     Final result by Gregg Jensen MD (08/26/23 17:38:59)                   Impression:      No evidence of acute intracranial pathology noting unavailable T2 sequence.    Scattered FLAIR hypertense signal within the supratentorial periventricular white matter, could be seen in chronic microvascular ischemic changes.    MR angiogram of the head and neck appears within normal limits.  No large vessel occlusion or aneurysm.    Electronically signed by resident: Regino Martini  Date:    08/26/2023  Time:    16:58    Electronically signed by: Gregg Jensen MD  Date:    08/26/2023  Time:    17:38               Narrative:    EXAMINATION:  MRI BRAIN WITHOUT CONTRAST; MRA NECK WITHOUT CONTRAST; MRA BRAIN WITHOUT CONTRAST    CLINICAL HISTORY:  Transient ischemic attack (TIA);    TECHNIQUE:  Multiplanar multisequence MR imaging of the brain was performed without the use of intravenous contrast.    Obtained as a separate acquisition from the MRI brain, a time-of-flight MR arteriogram of the cervical and intracranial vasculature with multiple MIPS reconstructions.    COMPARISON:  MRI 10/18/2011    FINDINGS:  Brain:    The T2 brain sequence is not included.    Generalized cerebral volume loss.  No hydrocephalus or midline shift.    No extra-axial blood or fluid collections.    Scattered FLAIR hyperintense signal within the supratentorial periventricular white matter, may be seen in chronic microvascular ischemic changes.  No parenchymal mass, hemorrhage, edema or recent or remote major vascular distribution infarct.    Bone marrow signal intensity unremarkable.    MRA:    Common and internal carotid arteries are normal in caliber.  No significant stenosis at either carotid  bifurcation.    Vertebral arteries are normal in caliber noting slightly dominant on the right.  The vertebrobasilar system appears within normal limits.    The ACAs, MCAs and PCAs demonstrate no evidence of  high-grade stenosis, focal occlusion or intracranial aneurysm.                                       MRA Neck without contrast (Final result)  Result time 08/26/23 17:38:59   Procedure changed from MRA Neck with and without contrast     Final result by Gregg Jensen MD (08/26/23 17:38:59)                   Impression:      No evidence of acute intracranial pathology noting unavailable T2 sequence.    Scattered FLAIR hypertense signal within the supratentorial periventricular white matter, could be seen in chronic microvascular ischemic changes.    MR angiogram of the head and neck appears within normal limits.  No large vessel occlusion or aneurysm.    Electronically signed by resident: Regino Martini  Date:    08/26/2023  Time:    16:58    Electronically signed by: Gregg Jensen MD  Date:    08/26/2023  Time:    17:38               Narrative:    EXAMINATION:  MRI BRAIN WITHOUT CONTRAST; MRA NECK WITHOUT CONTRAST; MRA BRAIN WITHOUT CONTRAST    CLINICAL HISTORY:  Transient ischemic attack (TIA);    TECHNIQUE:  Multiplanar multisequence MR imaging of the brain was performed without the use of intravenous contrast.    Obtained as a separate acquisition from the MRI brain, a time-of-flight MR arteriogram of the cervical and intracranial vasculature with multiple MIPS reconstructions.    COMPARISON:  MRI 10/18/2011    FINDINGS:  Brain:    The T2 brain sequence is not included.    Generalized cerebral volume loss.  No hydrocephalus or midline shift.    No extra-axial blood or fluid collections.    Scattered FLAIR hyperintense signal within the supratentorial periventricular white matter, may be seen in chronic microvascular ischemic changes.  No parenchymal mass, hemorrhage, edema or recent or remote major  vascular distribution infarct.    Bone marrow signal intensity unremarkable.    MRA:    Common and internal carotid arteries are normal in caliber.  No significant stenosis at either carotid bifurcation.    Vertebral arteries are normal in caliber noting slightly dominant on the right.  The vertebrobasilar system appears within normal limits.    The ACAs, MCAs and PCAs demonstrate no evidence of  high-grade stenosis, focal occlusion or intracranial aneurysm.                                       MRI Brain Without Contrast (Final result)  Result time 08/26/23 17:38:59      Final result by Gregg Jensen MD (08/26/23 17:38:59)                   Impression:      No evidence of acute intracranial pathology noting unavailable T2 sequence.    Scattered FLAIR hypertense signal within the supratentorial periventricular white matter, could be seen in chronic microvascular ischemic changes.    MR angiogram of the head and neck appears within normal limits.  No large vessel occlusion or aneurysm.    Electronically signed by resident: Regino Martini  Date:    08/26/2023  Time:    16:58    Electronically signed by: Gregg Jensen MD  Date:    08/26/2023  Time:    17:38               Narrative:    EXAMINATION:  MRI BRAIN WITHOUT CONTRAST; MRA NECK WITHOUT CONTRAST; MRA BRAIN WITHOUT CONTRAST    CLINICAL HISTORY:  Transient ischemic attack (TIA);    TECHNIQUE:  Multiplanar multisequence MR imaging of the brain was performed without the use of intravenous contrast.    Obtained as a separate acquisition from the MRI brain, a time-of-flight MR arteriogram of the cervical and intracranial vasculature with multiple MIPS reconstructions.    COMPARISON:  MRI 10/18/2011    FINDINGS:  Brain:    The T2 brain sequence is not included.    Generalized cerebral volume loss.  No hydrocephalus or midline shift.    No extra-axial blood or fluid collections.    Scattered FLAIR hyperintense signal within the supratentorial periventricular white  matter, may be seen in chronic microvascular ischemic changes.  No parenchymal mass, hemorrhage, edema or recent or remote major vascular distribution infarct.    Bone marrow signal intensity unremarkable.    MRA:    Common and internal carotid arteries are normal in caliber.  No significant stenosis at either carotid bifurcation.    Vertebral arteries are normal in caliber noting slightly dominant on the right.  The vertebrobasilar system appears within normal limits.    The ACAs, MCAs and PCAs demonstrate no evidence of  high-grade stenosis, focal occlusion or intracranial aneurysm.                                       Medications - No data to display  Medical Decision Making  Patient is 78-year-old female that presents to the emergency department today with her family after episode of lightheadedness.  Patient has past medical history of hypertension and diabetes.     Workup has included cardiac etiologies, and metabolic and pulmonary etiologies.    Considering transient ischemic event given symptoms of lightheadedness and instability with walking during the episode.  Today her blood pressure is much more controlled, lower suspicion for cardiovascular etiologies such as dissection or aneurysm.  Also low concern for ACS given EKG without significant changes.    Workup will include metabolic screening, cardiac labs and brain imaging.  History of positional symptoms patient admits symptoms with eating and with eating.  We will obtain head and neck vascular imaging with her MRI.     Orthostatics negative.    Discussed this patient at time of sign-out.  Pending MRI brain, MRA neck and brain, laboratory workup.    Amount and/or Complexity of Data Reviewed  Independent Historian:      Details: Daughter   External Data Reviewed: labs, ECG and notes.     Details: Compared previous labs and EKG to today's  Labs: ordered. Decision-making details documented in ED Course.  Radiology: ordered.  ECG/medicine tests: ordered and  "independent interpretation performed.              Attending Attestation:   Physician Attestation Statement for Resident:  As the supervising MD   Physician Attestation Statement: I have personally seen and examined this patient.   I agree with the above history.  -: 77 yo female presenting with an episode of lightheadedness, feeling near syncopal.    H/o recent similar episodes and admission for hypertension.   Denies chest pain or headache.   Also reports sometimes feeling as if her tongue is numb during the episodes.   Had associated blurry vision last week, but not currently.   Patient states the episodes sometimes occur after she has been knitting for a longer period of time with her neck but downward.     8/18 ECHO:  Regional wall motion abnormalities present. There is low normal systolic function with a visually estimated ejection fraction of 50 - 55%. There is normal diastolic function.  8/24 Cardiology outpatient visit reviewed: "Unclear of dizziness was a manifestation of a hypertensive emergency.  At this point she is much improved.  Blood pressure is well controlled."   As the supervising MD I agree with the above PE.   -: No facial asymmetry or slurred speech  Normal F2N  PERRL  5/5 shoulder shrug, elbow flexion/extension,  strength bilaterally  5/5 hip flexion, knee extension/flexion, dorsi/plantar flexion b/l  Ambulatory w/o ataxia  RRR, lungs clear  Non-tender abdomen  Oropharynx clear, uvula midline   As the supervising MD I agree with the above treatment, course, plan, and disposition.   -: Patient is presenting for return episode of near syncope, lightheadedness.  EKG without acute ischemic changes.  Reviewed prior admissions and workup.  Will add workup today for possible cerebral etiology, such as TIA.  Confirmed with MRI technologist, patient has breast biopsy marker in place, but this will be safe for MRI.  Confirmed with Radiology, no contrast required for MRI.  Blood pressure is better " controlled today.  Plan will be for MRI imaging, vascular neurology evaluation, CTNI x 2.  Lower suspicion for cardiac etiology at this time, but will assess with delta troponin.  If workup is unremarkable and she is improved, anticipate discharge with close PCP and Cardiology continued follow-up.  Patient understands and agrees with the plan.   I have reviewed and agree with the residents interpretation of the following: lab data and EKG.  I have reviewed the following: old records at this facility.                ED Course as of 08/27/23 0621   Sat Aug 26, 2023   1837 Ambulated to bathroom at baseline [LF]      ED Course User Index  [LF] Ayleen Dallas MD                    Clinical Impression:   Final diagnoses:  [R53.83] Fatigue  [R55] Pre-syncope (Primary)        ED Disposition Condition    Discharge Stable          ED Prescriptions    None       Follow-up Information       Follow up With Specialties Details Why Contact Info    Jh Mata MD Cardiovascular Disease, Cardiology, Interventional Cardiology Schedule an appointment as soon as possible for a visit   [address]  [address]  [facility] [address]  [phone]               Lisa Harrison DO  Resident  08/26/23 1616       Vivek Gallegos MD  08/27/23 0647

## 2023-08-26 NOTE — PROVIDER PROGRESS NOTES - EMERGENCY DEPT.
"Encounter Date: 8/26/2023    ED Physician Progress Notes        ED Resident HAND-OFF NOTE:  Beatrice Shankar is a 78 y.o. female who presented to the ED on 8/26/2023, patient C/O syncope. I assumed care of patient from off-going ED physician team patient pending MRI/MRA brain and neck.    On my evaluation, Beatrice Shankar appears well, hemodynamically stable and in NAD. Thus far, Beatrice Shankar has received:  Medications - No data to display    /61   Pulse 68   Temp 98.6 °F (37 °C) (Oral)   Resp 17   Ht 5' 4" (1.626 m)   Wt 85 kg (187 lb 6.3 oz)   SpO2 96%   Breastfeeding No   BMI 32.17 kg/m²         Disposition: I anticipate patient will discharged.  ______________________  Amando Osborne MD   Emergency Medicine Resident      UPDATE: repeat troponin wnl. MRI brain and MRA brain/neck show no acute intracranial abnormalities. Discussed case with vascular neurology, which is comfortable with outpatient follow-up. Patient ambulating without difficulty. Plan to discharge. Patient comfortable with plan. Return precautions given.        :  Fatigue    "

## 2023-08-26 NOTE — ED TRIAGE NOTES
Pt arrives to ED stating she called 911 because she felt like she was passing out. Pt states last week she was admitted her for feeling this way and now she feels the same way again. Pt states she feels better right now but feels light headed. Pt states the back of her tongue over a month. Pt denies CP or SOB. Pt denies HA. Pt denies numbness or tingling in extremities. Pt able to ambulate from stretcher to bed.

## 2023-08-26 NOTE — PROVIDER PROGRESS NOTES - EMERGENCY DEPT.
Encounter Date: 8/26/2023    ED Physician Progress Notes          Pt signed out to me at 3PM    77 yo F w/blurry vision, dizziness, near syncope, TIA w/u, third visit for this symptom    New regional WMA on echo, nl trop, EKG with TWI anterior leads seen in previous EKG  MRI brain/MRA head/neck with no intracranial abnl, no significant stenosis or aneurysm, less concern for TIA causing blurry vision at this time    Discussed with vascular neurology who agrees with plan to discharge pt with w/outpatient neurology f/u, was recently seen by cardiology with no further cardiac work up needed

## 2023-08-26 NOTE — DISCHARGE INSTRUCTIONS
You were seen in the Emergency Department today for lightheadedness and weakness. You were evaluated with physical exam, ECG, lab work, and MRI/MRA. No acute abnormalities were found.    Return to the Emergency Department if you experience any of the following:  - loss of consciousness  - new onset of weakness or lightheadedness  - fall  - ANY other new or worsening symptom    Follow up with:  - you cardiologist  - vascular neurology (referral ordered)

## 2023-08-28 ENCOUNTER — TELEPHONE (OUTPATIENT)
Dept: ADMINISTRATIVE | Facility: CLINIC | Age: 78
End: 2023-08-28
Payer: MEDICARE

## 2023-08-28 VITALS
SYSTOLIC BLOOD PRESSURE: 151 MMHG | HEIGHT: 64 IN | TEMPERATURE: 98 F | HEART RATE: 72 BPM | RESPIRATION RATE: 18 BRPM | BODY MASS INDEX: 33.29 KG/M2 | DIASTOLIC BLOOD PRESSURE: 67 MMHG | WEIGHT: 195 LBS | OXYGEN SATURATION: 95 %

## 2023-08-28 NOTE — TELEPHONE ENCOUNTER
Spoke with patient and she requested assistance with scheduling neurology appointment.  Patient has referral for vascular neurology.  Spoke with Josee in central scheduling.  In-basket message sent to [facility] Vascular Neurology for patient call back to schedule appointment.

## 2023-08-28 NOTE — PHYSICIAN QUERY
PT Name: Beatrice Shankar  MR #: [id number]     DOCUMENTATION CLARIFICATION      CDS/: COLE Reddy, RN, CCDS               Contact information: [email or web address]    This form is a permanent document in the medical record.     Query Date: August 28, 2023    Dear Provider,  By submitting this query, we are merely seeking further clarification of documentation.  Please utilize your independent clinical judgment when addressing the question(s) below.     The Medical Record contains the following:    Supporting Clinical Findings Location in Medical Record    found to be in hypertensive emergency with elevated troponins and evidence of T-wave inversion/ST segment depression on EKG    admitted to hospital medicine following initiation of ACS protocol and her blood pressure on my assessment was greater than 210/110.  P.r.n. labetalol was ordered.      She denies any cardiac symptoms including any chest pain/pressure, shortness of breath, palpitations, abdominal pain, vomiting, although she has had some nausea since she arrived    NSTEMI  On presentation to the ED, an EKG was noted to have some mild TWI. This prompted cardiac workup which demonstrated elevated troponins in the absence of symptoms.     Her vital signs since admission are notable for hypertensive urgency/emergency. Her initial troponin was 0.3 which increased to 0.45.    DAPT with 81 mg ASA and 75 mg clopidogrel beginning TOMORROW  load with 325 mg ASA and 300 mg clopidogrel TODAY  atorvastatin increased to 80 qd  Heparin gtt  Carvedilol 6.25 BID   H & P: Dr. Hoyt 8/18   her troponin leak was flat. Her symptoms are unlikely due to cardiac chest pain.     Cardiology is consulted for possible HTN emergency v type I NSTEMI given wall motion abnormalities on new echo    ECHO 8/18/23: Estimated ejection fraction of 50-55%. There is normal diastolic function. Left atrium is severly dilated. Regional wall motion abnormalities present in left  ventricle. Moderate aortic valve sclerosis with mild annular calcification and mild aortic regurgitation.  The following segments are hypokinetic: basal inferolateral, mid anteroseptal, mid inferoseptal and apical septal.    NSTEMI  Troponins mildly elevated but flat at 0.450 --> 0.527 --> 0.479She was started on ACS protocol    Rise in troponin likely due to elevated blood pressure with patient free of chest discomfort, favor hypertensive emergency as likely diagnosis   Cards: Dr. Luo    Troponin: 0.450, 0.527, 0.479    Vent. Rate : 072 BPM     Atrial Rate : 072 BPM      P-R Int : 188 ms          QRS Dur : 104 ms       QT Int : 396 ms       P-R-T Axes : 039 042 064 degrees      QTc Int : 433 ms     Normal sinus rhythm   T wave abnormality, consider anterior ischemia   Abnormal ECG   When compared with ECG of 24-SEP-2020 09:34,   T wave inversion no longer evident in Inferior leads   T wave inversion now evident in Anterior leads    Lab: , ,     EK/17   Trop peaked at 0.5, TTE with new WMAs, cardiology consulted recommended no antiplatelet therapy, improved BP control.     Patient was started on HCTZ 25mg at dc and coreg was increased from 12.5mg BID to 25mg BID. She was referred to cardiology on discharge.    NSTEMI   DCS: Dr. Fay      Please clarify if the ______NSTEMI_____________________ diagnosis has been:    [ x ] Ruled In   [  ] Ruled In, Now Resolved   [  ] Ruled Out   [  ] Other/Clarification of findings (please specify): _______________    [   ] Clinically undetermined     Please document in your progress notes daily for the duration of treatment, until resolved, and include in your discharge summary.    Form No. [id number]

## 2023-10-30 ENCOUNTER — HOSPITAL ENCOUNTER (EMERGENCY)
Facility: HOSPITAL | Age: 78
Discharge: HOME | End: 2023-10-30
Attending: EMERGENCY MEDICINE
Payer: MEDICARE

## 2023-10-30 VITALS
DIASTOLIC BLOOD PRESSURE: 82 MMHG | RESPIRATION RATE: 20 BRPM | SYSTOLIC BLOOD PRESSURE: 176 MMHG | HEART RATE: 91 BPM | OXYGEN SATURATION: 98 % | TEMPERATURE: 98 F | WEIGHT: 188 LBS | BODY MASS INDEX: 32.1 KG/M2 | HEIGHT: 64 IN

## 2023-10-30 DIAGNOSIS — R05.8 PRODUCTIVE COUGH: Primary | ICD-10-CM

## 2023-10-30 DIAGNOSIS — J98.01 ACUTE BRONCHOSPASM: ICD-10-CM

## 2023-10-30 PROCEDURE — 99284 EMERGENCY DEPT VISIT MOD MDM: CPT

## 2023-10-30 RX ORDER — BENZONATATE 200 MG/1
200 CAPSULE ORAL 3 TIMES DAILY PRN
Qty: 30 CAPSULE | Refills: 0 | Status: SHIPPED | OUTPATIENT
Start: 2023-10-30

## 2023-10-30 RX ORDER — PREDNISONE 20 MG/1
40 TABLET ORAL DAILY
Qty: 10 TABLET | Refills: 0 | Status: SHIPPED | OUTPATIENT
Start: 2023-10-30 | End: 2023-11-04

## 2023-10-30 NOTE — ED PROVIDER NOTES
Encounter Date: 10/30/2023       History     This history was obtained from the patient without limitations.    She is a 78-year-old with the below past medical history who presents by personal transportation.  She complains of persistent cough for over a month.  The cough is nonproductive but she feels congestion in her chest.  She denies chest pain.  She sometimes has episodes of heavy cough that results in shortness of breath.  She denies fever, chills, nausea, vomiting, chest pain, palpitations.  She denies sick contacts.  She has had multiple clinic visits for these symptoms.  She was prescribed albuterol MDI and Astelin nasal spray on an initial visit.  She returned 2 weeks later and was prescribed an azithromycin Z-Radhames and Medrol Dosepak. She's completed them both but has persistent symptoms.        Review of patient's allergies indicates:   Allergen Reactions    Codeine Nausea And Vomiting    Pneumoc 13-frida conj-dip cr(pf) Other (See Comments) and Shortness Of Breath     Asthma    Pneumococcal 23-frida ps vaccine Other (See Comments) and Shortness Of Breath     Asthuma    Erythromycin     Mycinette [cetylpyridinium-benzocaine]     Penicillins     Sulfur     Tetracyclines     Influenza virus vaccines Hives and Rash     Past Medical History:   Diagnosis Date    Anxiety     Atherosclerotic peripheral vascular disease     Diabetes mellitus, type 2     Gastroesophageal reflux disease     Hypercholesterolemia     Hypertension      Past Surgical History:   Procedure Laterality Date    APPENDECTOMY      BREAST BIOPSY Left     CATARACT EXTRACTION       SECTION      TUBAL LIGATION       Family History   Problem Relation Age of Onset    Cancer Mother     Hypertension Mother     Diabetes Mother     Hypertension Father     Diabetes Father     Heart disease Father     Diabetes Brother     Heart disease Brother     Heart attack Brother     Stroke Brother      Social History     Tobacco Use    Smoking status: Never     "Smokeless tobacco: Never   Substance Use Topics    Alcohol use: Yes     Comment: "Maybe at [place]."    Drug use: No     Physical Exam     Initial Vitals [10/30/23 1132]   BP Pulse Resp Temp SpO2   (!) 176/82 91 20 97.8 °F (36.6 °C) 98 %      MAP       --         Physical Exam    Nursing note and vitals reviewed.  Constitutional: She is not diaphoretic. No distress.   HENT:   Mouth/Throat: Oropharynx is clear and moist.   Neck: No JVD present.   Cardiovascular:  Normal rate, regular rhythm and normal heart sounds.     Exam reveals no gallop and no friction rub.       No murmur heard.  Pulmonary/Chest: No stridor. No respiratory distress. She has no wheezes. She has no rhonchi. She has no rales.   Abdominal: Abdomen is soft. There is no abdominal tenderness.     Neurological: She is alert and oriented to person, place, and time. GCS score is 15. GCS eye subscore is 4. GCS verbal subscore is 5. GCS motor subscore is 6.   Skin: Skin is warm and dry. No pallor.   Psychiatric: She is not actively hallucinating. She is attentive.         ED Course   Procedures  Labs Reviewed - No data to display       Imaging Results    None          Medications - No data to display  Medical Decision Making  Risk  Prescription drug management.                               Clinical Impression:   Final diagnoses:  [R05.8] Productive cough (Primary)  [J98.01] Acute bronchospasm        ED Disposition Condition    Discharge Stable          ED Prescriptions       Medication Sig Dispense Start Date End Date Auth. Provider    predniSONE (DELTASONE) 20 MG tablet Take 2 tablets (40 mg total) by mouth once daily. for 5 days 10 tablet 10/30/2023 11/4/2023 Bhavik Puri III, MD    benzonatate (TESSALON) 200 MG capsule Take 1 capsule (200 mg total) by mouth 3 (three) times daily as needed for Cough. 30 capsule 10/30/2023 -- Bhavik Puri III, MD          Follow-up Information       Follow up With Specialties Details Why Contact Info    Amairani, " Sena DE DIOS MD Internal Medicine In 3 days  [address] [address]  [phone]      ER   Return to the ER for worsened symptoms or for any other concerns needing immediate attention.              Bhavik Puri III, MD  10/31/23 0811

## 2023-10-30 NOTE — DISCHARGE INSTRUCTIONS
We know that you have many choices and are honored that you chose us. We hope that we met or exceeded your expectations and goals for this visit and will keep the [facility] in mind for your future needs and those of your family and friends.     - Dr. Puri

## 2023-10-30 NOTE — FIRST PROVIDER EVALUATION
"Medical screening examination initiated.  I have conducted a focused provider triage encounter, findings are as follows:    Brief history of present illness:  77 yo F c/o 2 weeks of cough, congestion. No abx use. Negative covid at home today. Symptoms improved w/ steroids but worsened after completion of steroids    Vitals:    10/30/23 1132   BP: (!) 176/82   Pulse: 91   Resp: 20   Temp: 97.8 °F (36.6 °C)   TempSrc: Oral   SpO2: 98%   Weight: 85.3 kg (188 lb)   Height: 5' 4" (1.626 m)       Pertinent physical exam:  nontoxic, well appearing. No wheeze, stridor, or distress    Brief workup plan:  cxr    Preliminary workup initiated; this workup will be continued and followed by the physician or advanced practice provider that is assigned to the patient when roomed.  "

## 2023-10-30 NOTE — ED TRIAGE NOTES
Patient presents to the ED states she was diagnosed with bronchitis October 2, 2023, discharged with an antibiotic, steroids, and  inhaler. States cough is progressively getting worse with a thick clear discharge. States she has finished her prescribed medications.

## 2023-11-20 PROBLEM — I21.4 NSTEMI (NON-ST ELEVATED MYOCARDIAL INFARCTION): Status: RESOLVED | Noted: 2023-08-17 | Resolved: 2023-11-20

## 2023-11-28 ENCOUNTER — OFFICE VISIT (OUTPATIENT)
Dept: CARDIOLOGY | Facility: CLINIC | Age: 78
End: 2023-11-28
Payer: MEDICARE

## 2023-11-28 VITALS
SYSTOLIC BLOOD PRESSURE: 134 MMHG | WEIGHT: 192 LBS | HEART RATE: 65 BPM | OXYGEN SATURATION: 98 % | BODY MASS INDEX: 32.96 KG/M2 | DIASTOLIC BLOOD PRESSURE: 76 MMHG

## 2023-11-28 DIAGNOSIS — E78.00 HYPERCHOLESTEROLEMIA: ICD-10-CM

## 2023-11-28 DIAGNOSIS — I10 PRIMARY HYPERTENSION: Primary | ICD-10-CM

## 2023-11-28 DIAGNOSIS — E66.01 SEVERE OBESITY: ICD-10-CM

## 2023-11-28 DIAGNOSIS — I70.209 ATHEROSCLEROTIC PERIPHERAL VASCULAR DISEASE: ICD-10-CM

## 2023-11-28 PROCEDURE — 99213 PR OFFICE/OUTPT VISIT, EST, LEVL III, 20-29 MIN: ICD-10-PCS | Mod: S$GLB,,, | Performed by: INTERNAL MEDICINE

## 2023-11-28 PROCEDURE — 3075F SYST BP GE 130 - 139MM HG: CPT | Mod: CPTII,S$GLB,, | Performed by: INTERNAL MEDICINE

## 2023-11-28 PROCEDURE — 99213 OFFICE O/P EST LOW 20 MIN: CPT | Mod: S$GLB,,, | Performed by: INTERNAL MEDICINE

## 2023-11-28 PROCEDURE — 1126F PR PAIN SEVERITY QUANTIFIED, NO PAIN PRESENT: ICD-10-PCS | Mod: CPTII,S$GLB,, | Performed by: INTERNAL MEDICINE

## 2023-11-28 PROCEDURE — 1159F PR MEDICATION LIST DOCUMENTED IN MEDICAL RECORD: ICD-10-PCS | Mod: CPTII,S$GLB,, | Performed by: INTERNAL MEDICINE

## 2023-11-28 PROCEDURE — 1101F PR PT FALLS ASSESS DOC 0-1 FALLS W/OUT INJ PAST YR: ICD-10-PCS | Mod: CPTII,S$GLB,, | Performed by: INTERNAL MEDICINE

## 2023-11-28 PROCEDURE — 99999 PR PBB SHADOW E&M-EST. PATIENT-LVL IV: ICD-10-PCS | Mod: PBBFAC,,, | Performed by: INTERNAL MEDICINE

## 2023-11-28 PROCEDURE — 1126F AMNT PAIN NOTED NONE PRSNT: CPT | Mod: CPTII,S$GLB,, | Performed by: INTERNAL MEDICINE

## 2023-11-28 PROCEDURE — 3288F FALL RISK ASSESSMENT DOCD: CPT | Mod: CPTII,S$GLB,, | Performed by: INTERNAL MEDICINE

## 2023-11-28 PROCEDURE — 3078F DIAST BP <80 MM HG: CPT | Mod: CPTII,S$GLB,, | Performed by: INTERNAL MEDICINE

## 2023-11-28 PROCEDURE — 3288F PR FALLS RISK ASSESSMENT DOCUMENTED: ICD-10-PCS | Mod: CPTII,S$GLB,, | Performed by: INTERNAL MEDICINE

## 2023-11-28 PROCEDURE — 1160F RVW MEDS BY RX/DR IN RCRD: CPT | Mod: CPTII,S$GLB,, | Performed by: INTERNAL MEDICINE

## 2023-11-28 PROCEDURE — 1159F MED LIST DOCD IN RCRD: CPT | Mod: CPTII,S$GLB,, | Performed by: INTERNAL MEDICINE

## 2023-11-28 PROCEDURE — 1101F PT FALLS ASSESS-DOCD LE1/YR: CPT | Mod: CPTII,S$GLB,, | Performed by: INTERNAL MEDICINE

## 2023-11-28 PROCEDURE — 1160F PR REVIEW ALL MEDS BY PRESCRIBER/CLIN PHARMACIST DOCUMENTED: ICD-10-PCS | Mod: CPTII,S$GLB,, | Performed by: INTERNAL MEDICINE

## 2023-11-28 PROCEDURE — 3075F PR MOST RECENT SYSTOLIC BLOOD PRESS GE 130-139MM HG: ICD-10-PCS | Mod: CPTII,S$GLB,, | Performed by: INTERNAL MEDICINE

## 2023-11-28 PROCEDURE — 3078F PR MOST RECENT DIASTOLIC BLOOD PRESSURE < 80 MM HG: ICD-10-PCS | Mod: CPTII,S$GLB,, | Performed by: INTERNAL MEDICINE

## 2023-11-28 PROCEDURE — 99999 PR PBB SHADOW E&M-EST. PATIENT-LVL IV: CPT | Mod: PBBFAC,,, | Performed by: INTERNAL MEDICINE

## 2023-11-28 NOTE — PROGRESS NOTES
[facility]    Chief Complaint  Chief Complaint   Patient presents with    Hypertension       HPI:    She is doing much better.  No further dizziness.  Avoiding dietary sodium which has resulted in good, stable blood pressure control.  No exertional dyspnea or chest discomfort.  No claudication.    Medications  Current Outpatient Medications   Medication Sig Dispense Refill    ascorbic acid, vitamin C, (VITAMIN C) 500 MG tablet Take 500 mg by mouth once daily.      aspirin (ECOTRIN) 81 MG EC tablet Take 81 mg by mouth once daily.      atorvastatin (LIPITOR) 20 MG tablet Take 20 mg by mouth.      benzonatate (TESSALON) 200 MG capsule Take 1 capsule (200 mg total) by mouth 3 (three) times daily as needed for Cough. 30 capsule 0    blood sugar diagnostic Strp CHECK BLOOD SUGAR THREE TIMES A DAY      carvediloL (COREG) 12.5 MG tablet Take 2 tablets (25 mg total) by mouth 2 (two) times daily. (Patient taking differently: Take 12.5 mg by mouth 2 (two) times daily.) 120 tablet 11    ergocalciferol, vitamin D2, (VITAMIN D ORAL) Take by mouth.      glipiZIDE (GLUCOTROL) 2.5 MG TR24 Take 2.5 mg by mouth.      olmesartan (BENICAR) 40 MG tablet Take 20 mg by mouth once daily.      omeprazole (PRILOSEC) 20 MG capsule Take 20 mg by mouth.      paroxetine (PAXIL) 10 MG tablet TAKE 1 TABLET BY MOUTH EVERY DAY       No current facility-administered medications for this visit.        History  Past Medical History:   Diagnosis Date    Anxiety     Atherosclerotic peripheral vascular disease     Diabetes mellitus, type 2     Gastroesophageal reflux disease     Hypercholesterolemia     Hypertension      Past Surgical History:   Procedure Laterality Date    APPENDECTOMY      BREAST BIOPSY Left     CATARACT EXTRACTION       SECTION      TUBAL LIGATION       Social History     Socioeconomic History    Marital status: Single   Tobacco Use    Smoking status: Never    Smokeless tobacco: Never   Substance and Sexual  "Activity    Alcohol use: Yes     Comment: "Maybe at [place]."    Drug use: No    Sexual activity: Never     Social Determinants of Health     Financial Resource Strain: Low Risk  (8/18/2023)    Overall Financial Resource Strain (CARDIA)     Difficulty of Paying Living Expenses: Not very hard   Food Insecurity: No Food Insecurity (8/18/2023)    Hunger Vital Sign     Worried About Running Out of Food in the Last Year: Never true     Ran Out of Food in the Last Year: Never true   Transportation Needs: No Transportation Needs (8/18/2023)    PRAPARE - Transportation     Lack of Transportation (Medical): No     Lack of Transportation (Non-Medical): No   Social Connections: Unknown (8/18/2023)    Social Connection and Isolation Panel [NHANES]     Frequency of Communication with Friends and Family: More than three times a week     Frequency of Social Gatherings with Friends and Family: More than three times a week   Housing Stability: Unknown (8/18/2023)    Housing Stability Vital Sign     Unable to Pay for Housing in the Last Year: No     Unstable Housing in the Last Year: No     Family History   Problem Relation Age of Onset    Cancer Mother     Hypertension Mother     Diabetes Mother     Hypertension Father     Diabetes Father     Heart disease Father     Diabetes Brother     Heart disease Brother     Heart attack Brother     Stroke Brother         Allergies  Review of patient's allergies indicates:   Allergen Reactions    Codeine Nausea And Vomiting    Pneumoc 13-frida conj-dip cr(pf) Other (See Comments) and Shortness Of Breath     Asthma    Pneumococcal 23-frida ps vaccine Other (See Comments) and Shortness Of Breath     Asthuma    Erythromycin     Mycinette [cetylpyridinium-benzocaine]     Penicillins     Sulfur     Tetracyclines     Influenza virus vaccines Hives and Rash       Review of Systems   Review of Systems   Constitutional: Negative for malaise/fatigue, weight gain and weight loss.   Eyes:  Negative for " visual disturbance.   Cardiovascular:  Negative for chest pain, claudication, cyanosis, dyspnea on exertion, irregular heartbeat, leg swelling, near-syncope, orthopnea, palpitations, paroxysmal nocturnal dyspnea and syncope.   Respiratory:  Negative for cough, hemoptysis, shortness of breath, sleep disturbances due to breathing and wheezing.    Hematologic/Lymphatic: Negative for bleeding problem. Does not bruise/bleed easily.   Skin:  Negative for poor wound healing.   Musculoskeletal:  Negative for muscle cramps and myalgias.   Gastrointestinal:  Negative for abdominal pain, anorexia, diarrhea, heartburn, hematemesis, hematochezia, melena, nausea and vomiting.   Genitourinary:  Negative for hematuria and nocturia.   Neurological:  Negative for excessive daytime sleepiness, dizziness, focal weakness, light-headedness and weakness.       Physical Exam  Vitals:    11/28/23 1448   BP: 134/76   Pulse: 65     Wt Readings from Last 1 Encounters:   11/28/23 87.1 kg (192 lb)     Physical Exam  Constitutional:       General: She is not in acute distress.     Appearance: She is not toxic-appearing or diaphoretic.   HENT:      Head: Normocephalic and atraumatic.   Eyes:      General: No scleral icterus.     Conjunctiva/sclera: Conjunctivae normal.   Neck:      Thyroid: No thyromegaly.      Vascular: No carotid bruit, hepatojugular reflux or JVD.      Trachea: No tracheal deviation.   Cardiovascular:      Rate and Rhythm: Normal rate and regular rhythm. No extrasystoles are present.     Chest Wall: PMI is not displaced.      Pulses:           Carotid pulses are 2+ on the right side and 2+ on the left side.       Radial pulses are 2+ on the right side and 2+ on the left side.        Dorsalis pedis pulses are 2+ on the right side and 2+ on the left side.        Posterior tibial pulses are 2+ on the right side and 2+ on the left side.      Heart sounds: S1 normal and S2 normal. No murmur heard.     No S3 or S4 sounds.    Pulmonary:      Effort: No accessory muscle usage or respiratory distress.      Breath sounds: No decreased breath sounds, wheezing, rhonchi or rales.   Abdominal:      General: Bowel sounds are normal. There is no abdominal bruit.      Palpations: Abdomen is soft. There is no hepatomegaly, splenomegaly or pulsatile mass.      Tenderness: There is no abdominal tenderness.   Musculoskeletal:         General: No tenderness or deformity.      Cervical back: Neck supple.   Skin:     General: Skin is warm and dry.      Coloration: Skin is not pale.      Nails: There is no clubbing.   Neurological:      Mental Status: She is alert and oriented to person, place, and time.      Cranial Nerves: No cranial nerve deficit.      Sensory: No sensory deficit.   Psychiatric:         Speech: Speech normal.         Behavior: Behavior normal. Behavior is cooperative.         Labs  Admission on 08/26/2023, Discharged on 08/26/2023   Component Date Value Ref Range Status    WBC 08/26/2023 7.77  3.90 - 12.70 K/uL Final    RBC 08/26/2023 5.17  4.00 - 5.40 M/uL Final    Hemoglobin 08/26/2023 14.4  12.0 - 16.0 g/dL Final    Hematocrit 08/26/2023 45.0  37.0 - 48.5 % Final    MCV 08/26/2023 87  82 - 98 fL Final    MCH 08/26/2023 27.9  27.0 - 31.0 pg Final    MCHC 08/26/2023 32.0  32.0 - 36.0 g/dL Final    RDW 08/26/2023 13.9  11.5 - 14.5 % Final    Platelets 08/26/2023 222  150 - 450 K/uL Final    MPV 08/26/2023 12.3  9.2 - 12.9 fL Final    Immature Granulocytes 08/26/2023 0.3  0.0 - 0.5 % Final    Gran # (ANC) 08/26/2023 5.2  1.8 - 7.7 K/uL Final    Immature Grans (Abs) 08/26/2023 0.02  0.00 - 0.04 K/uL Final    Comment: Mild elevation in immature granulocytes is non specific and   can be seen in a variety of conditions including stress response,   acute inflammation, trauma and pregnancy. Correlation with other   laboratory and clinical findings is essential.      Lymph # 08/26/2023 2.1  1.0 - 4.8 K/uL Final    Mono # 08/26/2023 0.4  0.3  - 1.0 K/uL Final    Eos # 08/26/2023 0.1  0.0 - 0.5 K/uL Final    Baso # 08/26/2023 0.05  0.00 - 0.20 K/uL Final    nRBC 08/26/2023 0  0 /100 WBC Final    Gran % 08/26/2023 66.5  38.0 - 73.0 % Final    Lymph % 08/26/2023 26.8  18.0 - 48.0 % Final    Mono % 08/26/2023 4.6  4.0 - 15.0 % Final    Eosinophil % 08/26/2023 1.2  0.0 - 8.0 % Final    Basophil % 08/26/2023 0.6  0.0 - 1.9 % Final    Differential Method 08/26/2023 Automated   Final    Sodium 08/26/2023 136  136 - 145 mmol/L Final    Potassium 08/26/2023 4.4  3.5 - 5.1 mmol/L Final    Chloride 08/26/2023 103  95 - 110 mmol/L Final    CO2 08/26/2023 25  23 - 29 mmol/L Final    Glucose 08/26/2023 145 (H)  70 - 110 mg/dL Final    BUN 08/26/2023 24 (H)  8 - 23 mg/dL Final    Creatinine 08/26/2023 0.8  0.5 - 1.4 mg/dL Final    Calcium 08/26/2023 10.0  8.7 - 10.5 mg/dL Final    Total Protein 08/26/2023 8.2  6.0 - 8.4 g/dL Final    Albumin 08/26/2023 4.3  3.5 - 5.2 g/dL Final    Total Bilirubin 08/26/2023 0.6  0.1 - 1.0 mg/dL Final    Comment: For infants and newborns, interpretation of results should be based  on gestational age, weight and in agreement with clinical  observations.    Premature Infant recommended reference ranges:  Up to 24 hours.............<8.0 mg/dL  Up to 48 hours............<12.0 mg/dL  3-5 days..................<15.0 mg/dL  6-29 days.................<15.0 mg/dL      Alkaline Phosphatase 08/26/2023 63  55 - 135 U/L Final    AST 08/26/2023 17  10 - 40 U/L Final    ALT 08/26/2023 14  10 - 44 U/L Final    eGFR 08/26/2023 >60.0  >60 mL/min/1.73 m^2 Final    Anion Gap 08/26/2023 8  8 - 16 mmol/L Final    Specimen UA 08/26/2023 Urine, Clean Catch   Final    Color, UA 08/26/2023 Colorless (A)  Yellow, Straw, Deborah Final    Appearance, UA 08/26/2023 Clear  Clear Final    pH, UA 08/26/2023 7.0  5.0 - 8.0 Final    Specific Gravity, UA 08/26/2023 1.010  1.005 - 1.030 Final    Protein, UA 08/26/2023 Negative  Negative Final    Comment: Recommend a 24 hour  urine protein or a urine   protein/creatinine ratio if globulin induced proteinuria is  clinically suspected.      Glucose, UA 08/26/2023 Negative  Negative Final    Ketones, UA 08/26/2023 Negative  Negative Final    Bilirubin (UA) 08/26/2023 Negative  Negative Final    Occult Blood UA 08/26/2023 Negative  Negative Final    Nitrite, UA 08/26/2023 Negative  Negative Final    Leukocytes, UA 08/26/2023 Negative  Negative Final    Troponin I 08/26/2023 <0.006  0.000 - 0.026 ng/mL Final    Comment: The reference interval for Troponin I represents the 99th percentile   cutoff   for our facility and is consistent with 3rd generation assay   performance.      BNP 08/26/2023 43  0 - 99 pg/mL Final    Values of less than 100 pg/ml are consistent with non-CHF populations.    Magnesium 08/26/2023 2.0  1.6 - 2.6 mg/dL Final    SARS-CoV-2 RNA, Amplification, Qual 08/26/2023 Negative  Negative Final    Comment: This test utilizes isothermal nucleic acid amplification technology   to   detect the SARS-CoV-2 RdRp nucleic acid segment. The analytical   sensitivity   (limit of detection) is 500 copies/swab.     A POSITIVE result is indicative of the presence of SARS-CoV-2 RNA;   clinical   correlation with patient history and other diagnostic information is   necessary to determine patient infection status.    A NEGATIVE result means that SARS-CoV-2 nucleic acids are not present   above   the limit of detection. A NEGATIVE result should be treated as   presumptive.   It does not rule out the possibility of COVID-19 and should not be   the sole   basis for treatment decisions. If COVID-19 is strongly suspected   based on   clinical and exposure history, re-testing using an alternate   molecular assay   should be considered.     This test is only for use under the Food and Drug Administration s   Emergency   Use Authorization (EUA).     Commercial kits are provided by [employer]. [employer]                           e    characteristics of the EUA have been independently verified by   [facility] Department of Pathology and Laboratory Medicine.   _________________________________________________________________   The authorized Fact Sheet for Healthcare Providers and the authorized   Fact   Sheet for Patients of the ID NOW COVID-19 are available on the FDA   website:   https://www.fda.gov/media/496752/download   https://www.fda.gov/media/759045/download      POCT Glucose 08/26/2023 139 (H)  70 - 110 mg/dL Final    Troponin I 08/26/2023 <0.006  0.000 - 0.026 ng/mL Final    Comment: The reference interval for Troponin I represents the 99th percentile   cutoff   for our facility and is consistent with 3rd generation assay   performance.     No results displayed because visit has over 200 results.          Imaging  No results found.    Assessment  1. Primary hypertension  Much better control with avoidance of dietary sodium  - Ambulatory referral/consult to Cardiology    2. Atherosclerotic peripheral vascular disease  Stable and free of claudication    3. Hypercholesterolemia  Controlled    4. Severe obesity  Unchanged      Plan and Discussion    No change to current guideline directed medical therapy.    The ASCVD Risk score (Enid DK, et al., 2019) failed to calculate for the following reasons:    The patient has a prior MI or stroke diagnosis     Follow Up  Follow up in about 6 months (around 5/28/2024).      Jh Mata MD

## 2024-05-28 ENCOUNTER — OFFICE VISIT (OUTPATIENT)
Dept: CARDIOLOGY | Facility: CLINIC | Age: 79
End: 2024-05-28
Payer: MEDICARE

## 2024-05-28 VITALS
OXYGEN SATURATION: 97 % | SYSTOLIC BLOOD PRESSURE: 126 MMHG | HEART RATE: 71 BPM | HEIGHT: 64 IN | RESPIRATION RATE: 19 BRPM | BODY MASS INDEX: 33.57 KG/M2 | WEIGHT: 196.63 LBS | DIASTOLIC BLOOD PRESSURE: 58 MMHG

## 2024-05-28 DIAGNOSIS — E78.00 HYPERCHOLESTEROLEMIA: ICD-10-CM

## 2024-05-28 DIAGNOSIS — I10 PRIMARY HYPERTENSION: ICD-10-CM

## 2024-05-28 DIAGNOSIS — E66.01 SEVERE OBESITY: ICD-10-CM

## 2024-05-28 DIAGNOSIS — I70.209 ATHEROSCLEROTIC PERIPHERAL VASCULAR DISEASE: Primary | ICD-10-CM

## 2024-05-28 PROCEDURE — 1160F RVW MEDS BY RX/DR IN RCRD: CPT | Mod: CPTII,S$GLB,, | Performed by: INTERNAL MEDICINE

## 2024-05-28 PROCEDURE — 3288F FALL RISK ASSESSMENT DOCD: CPT | Mod: CPTII,S$GLB,, | Performed by: INTERNAL MEDICINE

## 2024-05-28 PROCEDURE — 3078F DIAST BP <80 MM HG: CPT | Mod: CPTII,S$GLB,, | Performed by: INTERNAL MEDICINE

## 2024-05-28 PROCEDURE — 1101F PT FALLS ASSESS-DOCD LE1/YR: CPT | Mod: CPTII,S$GLB,, | Performed by: INTERNAL MEDICINE

## 2024-05-28 PROCEDURE — 1126F AMNT PAIN NOTED NONE PRSNT: CPT | Mod: CPTII,S$GLB,, | Performed by: INTERNAL MEDICINE

## 2024-05-28 PROCEDURE — 99214 OFFICE O/P EST MOD 30 MIN: CPT | Mod: S$GLB,,, | Performed by: INTERNAL MEDICINE

## 2024-05-28 PROCEDURE — 1159F MED LIST DOCD IN RCRD: CPT | Mod: CPTII,S$GLB,, | Performed by: INTERNAL MEDICINE

## 2024-05-28 PROCEDURE — 3074F SYST BP LT 130 MM HG: CPT | Mod: CPTII,S$GLB,, | Performed by: INTERNAL MEDICINE

## 2024-05-28 PROCEDURE — 99999 PR PBB SHADOW E&M-EST. PATIENT-LVL IV: CPT | Mod: PBBFAC,,, | Performed by: INTERNAL MEDICINE

## 2024-05-28 RX ORDER — NEOMYCIN SULFATE, POLYMYXIN B SULFATE, HYDROCORTISONE 3.5; 10000; 1 MG/ML; [USP'U]/ML; MG/ML
3 SOLUTION/ DROPS AURICULAR (OTIC) 4 TIMES DAILY
COMMUNITY
Start: 2024-05-15

## 2024-05-28 RX ORDER — AZELASTINE 1 MG/ML
1 SPRAY, METERED NASAL 2 TIMES DAILY
COMMUNITY

## 2024-05-28 NOTE — PROGRESS NOTES
[facility]    Chief Complaint  Chief Complaint   Patient presents with    Peripheral Arterial Disease       HPI:    She has been doing well.  No complaints.  Not very active.  With what she does, no exertional dyspnea or chest discomfort.  No claudication.  No nonhealing infections or ulcers in her feet.    Medications  Current Outpatient Medications   Medication Sig Dispense Refill    ascorbic acid, vitamin C, (VITAMIN C) 500 MG tablet Take 500 mg by mouth once daily.      aspirin (ECOTRIN) 81 MG EC tablet Take 81 mg by mouth once daily.      atorvastatin (LIPITOR) 20 MG tablet Take 20 mg by mouth.      benzonatate (TESSALON) 200 MG capsule Take 1 capsule (200 mg total) by mouth 3 (three) times daily as needed for Cough. 30 capsule 0    blood sugar diagnostic Strp CHECK BLOOD SUGAR THREE TIMES A DAY      carvediloL (COREG) 12.5 MG tablet Take 2 tablets (25 mg total) by mouth 2 (two) times daily. (Patient taking differently: Take 12.5 mg by mouth 2 (two) times daily.) 120 tablet 11    ergocalciferol, vitamin D2, (VITAMIN D ORAL) Take by mouth.      glipiZIDE (GLUCOTROL) 2.5 MG TR24 Take 2.5 mg by mouth.      olmesartan (BENICAR) 40 MG tablet Take 20 mg by mouth once daily.      omeprazole (PRILOSEC) 20 MG capsule Take 20 mg by mouth.      paroxetine (PAXIL) 10 MG tablet TAKE 1 TABLET BY MOUTH EVERY DAY      azelastine (ASTELIN) 137 mcg (0.1 %) nasal spray 1 spray 2 (two) times daily. (Patient not taking: Reported on 5/28/2024)      neomycin-polymyxin-hydrocortisone (CORTISPORIN) otic solution Place 3 drops into both ears 4 (four) times daily. (Patient not taking: Reported on 5/28/2024)       No current facility-administered medications for this visit.        History  Past Medical History:   Diagnosis Date    Anxiety     Atherosclerotic peripheral vascular disease     Diabetes mellitus, type 2     Gastroesophageal reflux disease     Hypercholesterolemia     Hypertension      Past Surgical History:  "  Procedure Laterality Date    APPENDECTOMY      BREAST BIOPSY Left     CATARACT EXTRACTION       SECTION      TUBAL LIGATION       Social History     Socioeconomic History    Marital status: Single   Tobacco Use    Smoking status: Never    Smokeless tobacco: Never   Substance and Sexual Activity    Alcohol use: Yes     Comment: "Maybe at [place]."    Drug use: No    Sexual activity: Never     Social Determinants of Health     Financial Resource Strain: Low Risk  (5/15/2024)    Received from [facility]    Overall Financial Resource Strain (CARDIA)     Difficulty of Paying Living Expenses: Not hard at all   Food Insecurity: No Food Insecurity (5/15/2024)    Received from [facility]    Hunger Vital Sign     Worried About Running Out of Food in the Last Year: Never true     Ran Out of Food in the Last Year: Never true   Transportation Needs: No Transportation Needs (5/15/2024)    Received from [facility]    PRAPARE - Transportation     Lack of Transportation (Medical): No     Lack of Transportation (Non-Medical): No   Physical Activity: Insufficiently Active (5/15/2024)    Received from [facility]    Exercise Vital Sign     Days of Exercise per Week: 2 days     Minutes of Exercise per Session: 60 min   Stress: Stress Concern Present (5/15/2024)    Received from [facility]    [employer] [place] [employer] - Occupational Stress Questionnaire     Feeling of Stress : To some extent   Housing Stability: Low Risk  (2023)    Received from [facility], [facility]    Housing Stability Vital Sign     Unable to Pay for Housing in the Last Year: No     Number of Places Lived in the Last Year: 1     In the last 12 months, was there a time when you did not have a steady place to sleep or slept in a shelter (including now)?: No     Family History   Problem Relation Name Age of Onset    Cancer Mother      Hypertension Mother      Diabetes Mother      Hypertension Father      Diabetes Father      Heart " disease Father      Diabetes Brother      Heart disease Brother      Heart attack Brother      Stroke Brother          Allergies  Review of patient's allergies indicates:   Allergen Reactions    Codeine Nausea And Vomiting    Pneumoc 13-frida conj-dip cr(pf) Other (See Comments) and Shortness Of Breath     Asthma    Pneumococcal 23-frida ps vaccine Other (See Comments) and Shortness Of Breath     Asthuma    Erythromycin     Mycinette [cetylpyridinium-benzocaine]     Penicillins     Sulfur     Tetracyclines     Influenza virus vaccines Hives and Rash       Review of Systems   Review of Systems   Constitutional: Negative for malaise/fatigue, weight gain and weight loss.   Eyes:  Negative for visual disturbance.   Cardiovascular:  Negative for chest pain, claudication, cyanosis, dyspnea on exertion, irregular heartbeat, leg swelling, near-syncope, orthopnea, palpitations, paroxysmal nocturnal dyspnea and syncope.   Respiratory:  Negative for cough, hemoptysis, shortness of breath, sleep disturbances due to breathing and wheezing.    Hematologic/Lymphatic: Negative for bleeding problem. Does not bruise/bleed easily.   Skin:  Negative for poor wound healing.   Musculoskeletal:  Negative for muscle cramps and myalgias.   Gastrointestinal:  Negative for abdominal pain, anorexia, diarrhea, heartburn, hematemesis, hematochezia, melena, nausea and vomiting.   Genitourinary:  Negative for hematuria and nocturia.   Neurological:  Negative for excessive daytime sleepiness, dizziness, focal weakness, light-headedness and weakness.       Physical Exam  Vitals:    05/28/24 1419   BP: (!) 126/58   Pulse: 71   Resp: 19     Wt Readings from Last 1 Encounters:   05/28/24 89.2 kg (196 lb 9.6 oz)     Physical Exam  Constitutional:       General: She is not in acute distress.     Appearance: She is not toxic-appearing or diaphoretic.   HENT:      Head: Normocephalic and atraumatic.   Eyes:      General: No scleral icterus.      Conjunctiva/sclera: Conjunctivae normal.   Neck:      Thyroid: No thyromegaly.      Vascular: No carotid bruit, hepatojugular reflux or JVD.      Trachea: No tracheal deviation.   Cardiovascular:      Rate and Rhythm: Normal rate and regular rhythm. No extrasystoles are present.     Chest Wall: PMI is not displaced.      Pulses:           Carotid pulses are 2+ on the right side and 2+ on the left side.       Radial pulses are 2+ on the right side and 2+ on the left side.        Dorsalis pedis pulses are 2+ on the right side and 2+ on the left side.        Posterior tibial pulses are 2+ on the right side and 2+ on the left side.      Heart sounds: S1 normal and S2 normal. No murmur heard.     No S3 or S4 sounds.   Pulmonary:      Effort: No accessory muscle usage or respiratory distress.      Breath sounds: No decreased breath sounds, wheezing, rhonchi or rales.   Abdominal:      General: Bowel sounds are normal. There is no abdominal bruit.      Palpations: Abdomen is soft. There is no hepatomegaly, splenomegaly or pulsatile mass.      Tenderness: There is no abdominal tenderness.   Musculoskeletal:         General: No tenderness or deformity.      Cervical back: Neck supple.   Skin:     General: Skin is warm and dry.      Coloration: Skin is not pale.      Nails: There is no clubbing.   Neurological:      Mental Status: She is alert and oriented to person, place, and time.      Cranial Nerves: No cranial nerve deficit.      Sensory: No sensory deficit.   Psychiatric:         Speech: Speech normal.         Behavior: Behavior normal. Behavior is cooperative.         Labs  No visits with results within 6 Month(s) from this visit.   Latest known visit with results is:   Admission on 08/26/2023, Discharged on 08/26/2023   Component Date Value Ref Range Status    WBC 08/26/2023 7.77  3.90 - 12.70 K/uL Final    RBC 08/26/2023 5.17  4.00 - 5.40 M/uL Final    Hemoglobin 08/26/2023 14.4  12.0 - 16.0 g/dL Final    Hematocrit  08/26/2023 45.0  37.0 - 48.5 % Final    MCV 08/26/2023 87  82 - 98 fL Final    MCH 08/26/2023 27.9  27.0 - 31.0 pg Final    MCHC 08/26/2023 32.0  32.0 - 36.0 g/dL Final    RDW 08/26/2023 13.9  11.5 - 14.5 % Final    Platelets 08/26/2023 222  150 - 450 K/uL Final    MPV 08/26/2023 12.3  9.2 - 12.9 fL Final    Immature Granulocytes 08/26/2023 0.3  0.0 - 0.5 % Final    Gran # (ANC) 08/26/2023 5.2  1.8 - 7.7 K/uL Final    Immature Grans (Abs) 08/26/2023 0.02  0.00 - 0.04 K/uL Final    Comment: Mild elevation in immature granulocytes is non specific and   can be seen in a variety of conditions including stress response,   acute inflammation, trauma and pregnancy. Correlation with other   laboratory and clinical findings is essential.      Lymph # 08/26/2023 2.1  1.0 - 4.8 K/uL Final    Mono # 08/26/2023 0.4  0.3 - 1.0 K/uL Final    Eos # 08/26/2023 0.1  0.0 - 0.5 K/uL Final    Baso # 08/26/2023 0.05  0.00 - 0.20 K/uL Final    nRBC 08/26/2023 0  0 /100 WBC Final    Gran % 08/26/2023 66.5  38.0 - 73.0 % Final    Lymph % 08/26/2023 26.8  18.0 - 48.0 % Final    Mono % 08/26/2023 4.6  4.0 - 15.0 % Final    Eosinophil % 08/26/2023 1.2  0.0 - 8.0 % Final    Basophil % 08/26/2023 0.6  0.0 - 1.9 % Final    Differential Method 08/26/2023 Automated   Final    Sodium 08/26/2023 136  136 - 145 mmol/L Final    Potassium 08/26/2023 4.4  3.5 - 5.1 mmol/L Final    Chloride 08/26/2023 103  95 - 110 mmol/L Final    CO2 08/26/2023 25  23 - 29 mmol/L Final    Glucose 08/26/2023 145 (H)  70 - 110 mg/dL Final    BUN 08/26/2023 24 (H)  8 - 23 mg/dL Final    Creatinine 08/26/2023 0.8  0.5 - 1.4 mg/dL Final    Calcium 08/26/2023 10.0  8.7 - 10.5 mg/dL Final    Total Protein 08/26/2023 8.2  6.0 - 8.4 g/dL Final    Albumin 08/26/2023 4.3  3.5 - 5.2 g/dL Final    Total Bilirubin 08/26/2023 0.6  0.1 - 1.0 mg/dL Final    Comment: For infants and newborns, interpretation of results should be based  on gestational age, weight and in agreement with  clinical  observations.    Premature Infant recommended reference ranges:  Up to 24 hours.............<8.0 mg/dL  Up to 48 hours............<12.0 mg/dL  3-5 days..................<15.0 mg/dL  6-29 days.................<15.0 mg/dL      Alkaline Phosphatase 08/26/2023 63  55 - 135 U/L Final    AST 08/26/2023 17  10 - 40 U/L Final    ALT 08/26/2023 14  10 - 44 U/L Final    eGFR 08/26/2023 >60.0  >60 mL/min/1.73 m^2 Final    Anion Gap 08/26/2023 8  8 - 16 mmol/L Final    Specimen UA 08/26/2023 Urine, Clean Catch   Final    Color, UA 08/26/2023 Colorless (A)  Yellow, Straw, Deborah Final    Appearance, UA 08/26/2023 Clear  Clear Final    pH, UA 08/26/2023 7.0  5.0 - 8.0 Final    Specific Gravity, UA 08/26/2023 1.010  1.005 - 1.030 Final    Protein, UA 08/26/2023 Negative  Negative Final    Comment: Recommend a 24 hour urine protein or a urine   protein/creatinine ratio if globulin induced proteinuria is  clinically suspected.      Glucose, UA 08/26/2023 Negative  Negative Final    Ketones, UA 08/26/2023 Negative  Negative Final    Bilirubin (UA) 08/26/2023 Negative  Negative Final    Occult Blood UA 08/26/2023 Negative  Negative Final    Nitrite, UA 08/26/2023 Negative  Negative Final    Leukocytes, UA 08/26/2023 Negative  Negative Final    Troponin I 08/26/2023 <0.006  0.000 - 0.026 ng/mL Final    Comment: The reference interval for Troponin I represents the 99th percentile   cutoff   for our facility and is consistent with 3rd generation assay   performance.      BNP 08/26/2023 43  0 - 99 pg/mL Final    Values of less than 100 pg/ml are consistent with non-CHF populations.    Magnesium 08/26/2023 2.0  1.6 - 2.6 mg/dL Final    SARS-CoV-2 RNA, Amplification, Qual 08/26/2023 Negative  Negative Final    Comment: This test utilizes isothermal nucleic acid amplification technology   to   detect the SARS-CoV-2 RdRp nucleic acid segment. The analytical   sensitivity   (limit of detection) is 500 copies/swab.     A POSITIVE  result is indicative of the presence of SARS-CoV-2 RNA;   clinical   correlation with patient history and other diagnostic information is   necessary to determine patient infection status.    A NEGATIVE result means that SARS-CoV-2 nucleic acids are not present   above   the limit of detection. A NEGATIVE result should be treated as   presumptive.   It does not rule out the possibility of COVID-19 and should not be   the sole   basis for treatment decisions. If COVID-19 is strongly suspected   based on   clinical and exposure history, re-testing using an alternate   molecular assay   should be considered.     This test is only for use under the Food and Drug Administration s   Emergency   Use Authorization (EUA).     Commercial kits are provided by [employer]. Performanc                           e   characteristics of the EUA have been independently verified by   [facility] Department of Pathology and Laboratory Medicine.   _________________________________________________________________   The authorized Fact Sheet for Healthcare Providers and the authorized   Fact   Sheet for Patients of the ID NOW COVID-19 are available on the FDA   website:   https://www.fda.gov/media/320683/download   https://www.fda.gov/media/234079/download      POCT Glucose 08/26/2023 139 (H)  70 - 110 mg/dL Final    Troponin I 08/26/2023 <0.006  0.000 - 0.026 ng/mL Final    Comment: The reference interval for Troponin I represents the 99th percentile   cutoff   for our facility and is consistent with 3rd generation assay   performance.         Imaging  No results found.    Assessment  1. Atherosclerotic peripheral vascular disease  Stable and asymptomatic    2. Severe obesity  Unchanged    3. Primary hypertension  Controlled    4. Hypercholesterolemia  Controlled      Plan and Discussion    No change to current guideline directed medical therapy    The ASCVD Risk score (Enid DK, et al., 2019) failed to calculate for  the following reasons:    The patient has a prior MI or stroke diagnosis     Follow Up  Follow up in about 6 months (around 11/28/2024).      Jh Mata MD

## 2024-12-30 ENCOUNTER — OFFICE VISIT (OUTPATIENT)
Dept: CARDIOLOGY | Facility: CLINIC | Age: 79
End: 2024-12-30
Payer: MEDICARE

## 2024-12-30 VITALS
OXYGEN SATURATION: 97 % | DIASTOLIC BLOOD PRESSURE: 58 MMHG | BODY MASS INDEX: 34.15 KG/M2 | HEIGHT: 64 IN | SYSTOLIC BLOOD PRESSURE: 136 MMHG | HEART RATE: 66 BPM | WEIGHT: 200 LBS

## 2024-12-30 DIAGNOSIS — E11.51 TYPE 2 DIABETES MELLITUS WITH DIABETIC PERIPHERAL ANGIOPATHY WITHOUT GANGRENE, WITHOUT LONG-TERM CURRENT USE OF INSULIN: ICD-10-CM

## 2024-12-30 DIAGNOSIS — E78.00 HYPERCHOLESTEROLEMIA: ICD-10-CM

## 2024-12-30 DIAGNOSIS — I10 PRIMARY HYPERTENSION: ICD-10-CM

## 2024-12-30 DIAGNOSIS — E66.01 SEVERE OBESITY: ICD-10-CM

## 2024-12-30 DIAGNOSIS — I70.209 ATHEROSCLEROTIC PERIPHERAL VASCULAR DISEASE: Primary | ICD-10-CM

## 2024-12-30 PROCEDURE — 1126F AMNT PAIN NOTED NONE PRSNT: CPT | Mod: CPTII,S$GLB,, | Performed by: INTERNAL MEDICINE

## 2024-12-30 PROCEDURE — 99999 PR PBB SHADOW E&M-EST. PATIENT-LVL III: CPT | Mod: PBBFAC,,, | Performed by: INTERNAL MEDICINE

## 2024-12-30 PROCEDURE — 1159F MED LIST DOCD IN RCRD: CPT | Mod: CPTII,S$GLB,, | Performed by: INTERNAL MEDICINE

## 2024-12-30 PROCEDURE — 99214 OFFICE O/P EST MOD 30 MIN: CPT | Mod: S$GLB,,, | Performed by: INTERNAL MEDICINE

## 2024-12-30 PROCEDURE — 3078F DIAST BP <80 MM HG: CPT | Mod: CPTII,S$GLB,, | Performed by: INTERNAL MEDICINE

## 2024-12-30 PROCEDURE — 3075F SYST BP GE 130 - 139MM HG: CPT | Mod: CPTII,S$GLB,, | Performed by: INTERNAL MEDICINE

## 2024-12-30 PROCEDURE — 1160F RVW MEDS BY RX/DR IN RCRD: CPT | Mod: CPTII,S$GLB,, | Performed by: INTERNAL MEDICINE

## 2024-12-30 NOTE — PROGRESS NOTES
"[facility]    Chief Complaint  Chief Complaint   Patient presents with    Follow-up       HPI:    No complaints.  She continues to do well.  Needs to exercise more.  With what she does, no exertional dyspnea or chest discomfort.  No claudication.  No nonhealing ulcers or infections.    Medications  Current Outpatient Medications   Medication Sig Dispense Refill    aspirin (ECOTRIN) 81 MG EC tablet Take 81 mg by mouth once daily.      atorvastatin (LIPITOR) 20 MG tablet Take 20 mg by mouth.      blood sugar diagnostic Strp CHECK BLOOD SUGAR THREE TIMES A DAY      glipiZIDE (GLUCOTROL) 2.5 MG TR24 Take 2.5 mg by mouth.      olmesartan (BENICAR) 40 MG tablet Take 20 mg by mouth once daily.      omeprazole (PRILOSEC) 20 MG capsule Take 20 mg by mouth.      paroxetine (PAXIL) 10 MG tablet TAKE 1 TABLET BY MOUTH EVERY DAY      carvediloL (COREG) 12.5 MG tablet Take 2 tablets (25 mg total) by mouth 2 (two) times daily. (Patient taking differently: Take 12.5 mg by mouth 2 (two) times daily.) 120 tablet 11     No current facility-administered medications for this visit.        History  Past Medical History:   Diagnosis Date    Anxiety     Atherosclerotic peripheral vascular disease     Diabetes mellitus, type 2     Gastroesophageal reflux disease     Hypercholesterolemia     Hypertension      Past Surgical History:   Procedure Laterality Date    APPENDECTOMY      BREAST BIOPSY Left     CATARACT EXTRACTION       SECTION      TUBAL LIGATION       Social History     Socioeconomic History    Marital status: Single   Tobacco Use    Smoking status: Never    Smokeless tobacco: Never   Substance and Sexual Activity    Alcohol use: Yes     Comment: "Maybe at [place]."    Drug use: No    Sexual activity: Never     Social Drivers of Health     Financial Resource Strain: Low Risk  (2024)    Overall Financial Resource Strain (CARDIA)     Difficulty of Paying Living Expenses: Not hard at all   Food " Insecurity: No Food Insecurity (12/23/2024)    Hunger Vital Sign     Worried About Running Out of Food in the Last Year: Never true     Ran Out of Food in the Last Year: Never true   Transportation Needs: No Transportation Needs (5/15/2024)    [facility]    PRAPARE - Transportation     Lack of Transportation (Medical): No     Lack of Transportation (Non-Medical): No   Physical Activity: Insufficiently Active (12/23/2024)    Exercise Vital Sign     Days of Exercise per Week: 1 day     Minutes of Exercise per Session: 60 min   Stress: No Stress Concern Present (12/23/2024)    [employer] [place] [employer] - Occupational Stress Questionnaire     Feeling of Stress : Not at all   Housing Stability: Unknown (12/23/2024)    Housing Stability Vital Sign     Unable to Pay for Housing in the Last Year: No     Family History   Problem Relation Name Age of Onset    Cancer Mother      Hypertension Mother      Diabetes Mother      Hypertension Father      Diabetes Father      Heart disease Father      Diabetes Brother      Heart disease Brother      Heart attack Brother      Stroke Brother          Allergies  Review of patient's allergies indicates:   Allergen Reactions    Codeine Nausea And Vomiting    Pneumoc 13-frida conj-dip cr(pf) Other (See Comments) and Shortness Of Breath     Asthma    Pneumococcal 23-frida ps vaccine Other (See Comments) and Shortness Of Breath     Asthuma    Erythromycin     Mycinette [cetylpyridinium-benzocaine]     Penicillins     Sulfur     Tetracyclines     Influenza virus vaccines Hives and Rash       Review of Systems   Review of Systems   Constitutional: Negative for malaise/fatigue, weight gain and weight loss.   Eyes:  Negative for visual disturbance.   Cardiovascular:  Negative for chest pain, claudication, cyanosis, dyspnea on exertion, irregular heartbeat, leg swelling, near-syncope, orthopnea, palpitations, paroxysmal nocturnal dyspnea and syncope.   Respiratory:   Negative for cough, hemoptysis, shortness of breath, sleep disturbances due to breathing and wheezing.    Hematologic/Lymphatic: Negative for bleeding problem. Does not bruise/bleed easily.   Skin:  Negative for poor wound healing.   Musculoskeletal:  Negative for muscle cramps and myalgias.   Gastrointestinal:  Negative for abdominal pain, anorexia, diarrhea, heartburn, hematemesis, hematochezia, melena, nausea and vomiting.   Genitourinary:  Negative for hematuria and nocturia.   Neurological:  Negative for excessive daytime sleepiness, dizziness, focal weakness, light-headedness and weakness.       Physical Exam  Vitals:    12/30/24 1546   BP: (!) 136/58   Pulse: 66     Wt Readings from Last 1 Encounters:   12/30/24 90.7 kg (200 lb)     Physical Exam  Constitutional:       General: She is not in acute distress.     Appearance: She is not toxic-appearing or diaphoretic.   HENT:      Head: Normocephalic and atraumatic.   Eyes:      General: No scleral icterus.     Conjunctiva/sclera: Conjunctivae normal.   Neck:      Thyroid: No thyromegaly.      Vascular: No carotid bruit, hepatojugular reflux or JVD.      Trachea: No tracheal deviation.   Cardiovascular:      Rate and Rhythm: Normal rate and regular rhythm. No extrasystoles are present.     Chest Wall: PMI is not displaced.      Pulses:           Carotid pulses are 2+ on the right side and 2+ on the left side.       Radial pulses are 2+ on the right side and 2+ on the left side.        Dorsalis pedis pulses are 2+ on the right side and 2+ on the left side.        Posterior tibial pulses are 2+ on the right side and 2+ on the left side.      Heart sounds: S1 normal and S2 normal. No murmur heard.     No S3 or S4 sounds.   Pulmonary:      Effort: No accessory muscle usage or respiratory distress.      Breath sounds: No decreased breath sounds, wheezing, rhonchi or rales.   Abdominal:      General: Bowel sounds are normal. There is no abdominal bruit.       Palpations: Abdomen is soft. There is no hepatomegaly, splenomegaly or pulsatile mass.      Tenderness: There is no abdominal tenderness.   Musculoskeletal:         General: No tenderness or deformity.      Cervical back: Neck supple.   Skin:     General: Skin is warm and dry.      Coloration: Skin is not pale.      Nails: There is no clubbing.   Neurological:      Mental Status: She is alert and oriented to person, place, and time.      Cranial Nerves: No cranial nerve deficit.      Sensory: No sensory deficit.   Psychiatric:         Speech: Speech normal.         Behavior: Behavior normal. Behavior is cooperative.         Labs  No visits with results within 6 Month(s) from this visit.   Latest known visit with results is:   Admission on 08/26/2023, Discharged on 08/26/2023   Component Date Value Ref Range Status    WBC 08/26/2023 7.77  3.90 - 12.70 K/uL Final    RBC 08/26/2023 5.17  4.00 - 5.40 M/uL Final    Hemoglobin 08/26/2023 14.4  12.0 - 16.0 g/dL Final    Hematocrit 08/26/2023 45.0  37.0 - 48.5 % Final    MCV 08/26/2023 87  82 - 98 fL Final    MCH 08/26/2023 27.9  27.0 - 31.0 pg Final    MCHC 08/26/2023 32.0  32.0 - 36.0 g/dL Final    RDW 08/26/2023 13.9  11.5 - 14.5 % Final    Platelets 08/26/2023 222  150 - 450 K/uL Final    MPV 08/26/2023 12.3  9.2 - 12.9 fL Final    Immature Granulocytes 08/26/2023 0.3  0.0 - 0.5 % Final    Gran # (ANC) 08/26/2023 5.2  1.8 - 7.7 K/uL Final    Immature Grans (Abs) 08/26/2023 0.02  0.00 - 0.04 K/uL Final    Comment: Mild elevation in immature granulocytes is non specific and   can be seen in a variety of conditions including stress response,   acute inflammation, trauma and pregnancy. Correlation with other   laboratory and clinical findings is essential.      Lymph # 08/26/2023 2.1  1.0 - 4.8 K/uL Final    Mono # 08/26/2023 0.4  0.3 - 1.0 K/uL Final    Eos # 08/26/2023 0.1  0.0 - 0.5 K/uL Final    Baso # 08/26/2023 0.05  0.00 - 0.20 K/uL Final    nRBC 08/26/2023 0  0 /100  WBC Final    Gran % 08/26/2023 66.5  38.0 - 73.0 % Final    Lymph % 08/26/2023 26.8  18.0 - 48.0 % Final    Mono % 08/26/2023 4.6  4.0 - 15.0 % Final    Eosinophil % 08/26/2023 1.2  0.0 - 8.0 % Final    Basophil % 08/26/2023 0.6  0.0 - 1.9 % Final    Differential Method 08/26/2023 Automated   Final    Sodium 08/26/2023 136  136 - 145 mmol/L Final    Potassium 08/26/2023 4.4  3.5 - 5.1 mmol/L Final    Chloride 08/26/2023 103  95 - 110 mmol/L Final    CO2 08/26/2023 25  23 - 29 mmol/L Final    Glucose 08/26/2023 145 (H)  70 - 110 mg/dL Final    BUN 08/26/2023 24 (H)  8 - 23 mg/dL Final    Creatinine 08/26/2023 0.8  0.5 - 1.4 mg/dL Final    Calcium 08/26/2023 10.0  8.7 - 10.5 mg/dL Final    Total Protein 08/26/2023 8.2  6.0 - 8.4 g/dL Final    Albumin 08/26/2023 4.3  3.5 - 5.2 g/dL Final    Total Bilirubin 08/26/2023 0.6  0.1 - 1.0 mg/dL Final    Comment: For infants and newborns, interpretation of results should be based  on gestational age, weight and in agreement with clinical  observations.    Premature Infant recommended reference ranges:  Up to 24 hours.............<8.0 mg/dL  Up to 48 hours............<12.0 mg/dL  3-5 days..................<15.0 mg/dL  6-29 days.................<15.0 mg/dL      Alkaline Phosphatase 08/26/2023 63  55 - 135 U/L Final    AST 08/26/2023 17  10 - 40 U/L Final    ALT 08/26/2023 14  10 - 44 U/L Final    eGFR 08/26/2023 >60.0  >60 mL/min/1.73 m^2 Final    Anion Gap 08/26/2023 8  8 - 16 mmol/L Final    Specimen UA 08/26/2023 Urine, Clean Catch   Final    Color, UA 08/26/2023 Colorless (A)  Yellow, Straw, Deborah Final    Appearance, UA 08/26/2023 Clear  Clear Final    pH, UA 08/26/2023 7.0  5.0 - 8.0 Final    Specific Gravity, UA 08/26/2023 1.010  1.005 - 1.030 Final    Protein, UA 08/26/2023 Negative  Negative Final    Comment: Recommend a 24 hour urine protein or a urine   protein/creatinine ratio if globulin induced proteinuria is  clinically suspected.      Glucose, UA 08/26/2023  Negative  Negative Final    Ketones, UA 08/26/2023 Negative  Negative Final    Bilirubin (UA) 08/26/2023 Negative  Negative Final    Occult Blood UA 08/26/2023 Negative  Negative Final    Nitrite, UA 08/26/2023 Negative  Negative Final    Leukocytes, UA 08/26/2023 Negative  Negative Final    Troponin I 08/26/2023 <0.006  0.000 - 0.026 ng/mL Final    Comment: The reference interval for Troponin I represents the 99th percentile   cutoff   for our facility and is consistent with 3rd generation assay   performance.      BNP 08/26/2023 43  0 - 99 pg/mL Final    Values of less than 100 pg/ml are consistent with non-CHF populations.    Magnesium 08/26/2023 2.0  1.6 - 2.6 mg/dL Final    SARS-CoV-2 RNA, Amplification, Qual 08/26/2023 Negative  Negative Final    Comment: This test utilizes isothermal nucleic acid amplification technology   to   detect the SARS-CoV-2 RdRp nucleic acid segment. The analytical   sensitivity   (limit of detection) is 500 copies/swab.     A POSITIVE result is indicative of the presence of SARS-CoV-2 RNA;   clinical   correlation with patient history and other diagnostic information is   necessary to determine patient infection status.    A NEGATIVE result means that SARS-CoV-2 nucleic acids are not present   above   the limit of detection. A NEGATIVE result should be treated as   presumptive.   It does not rule out the possibility of COVID-19 and should not be   the sole   basis for treatment decisions. If COVID-19 is strongly suspected   based on   clinical and exposure history, re-testing using an alternate   molecular assay   should be considered.     This test is only for use under the Food and Drug Administration s   Emergency   Use Authorization (EUA).     Commercial kits are provided by [employer]. Performanc                           e   characteristics of the EUA have been independently verified by   [facility] Department of Pathology and Laboratory Medicine.    _________________________________________________________________   The authorized Fact Sheet for Healthcare Providers and the authorized   Fact   Sheet for Patients of the ID NOW COVID-19 are available on the FDA   website:   https://www.fda.gov/media/651809/download   https://www.fda.gov/media/277612/download      POCT Glucose 08/26/2023 139 (H)  70 - 110 mg/dL Final    Troponin I 08/26/2023 <0.006  0.000 - 0.026 ng/mL Final    Comment: The reference interval for Troponin I represents the 99th percentile   cutoff   for our facility and is consistent with 3rd generation assay   performance.         Imaging  Mammo Digital Diagnostic Right with [facility]    Result Date: 12/18/2024  [address] [address]  [phone] 12/18/2024 9:15 AM [facility] MAMMO TOMOSYNTHESIS DIAGNOSTIC RIGHT INDICATION: Callback from screening mammogram. COMPARISON: Multiple prior studies are available for comparison dating back to 1/2/2009 TECHNIQUE: The following mammographic views were obtained using digital mammographic technique: Bilateral craniocaudal and bilateral mediolateral oblique , with and without Tomosynthesis. The examination was reviewed utilizing the [employer] image  computer aided detection (CAD) system. DENSITY: The breasts are almost entirely fatty.   (a) FINDINGS: Computer-aided detection was utilized by the radiologist in the interpretation of this examination. Additional evaluation was performed for the calcifications seen on recent screening mammogram in the right breast. With additional evaluation, there are grouped amorphous calcifications with an associated focal asymmetry in the upper inner quadrant of the right breast measuring 2 cm and located 13 cm from the nipple.    Amorphous calcifications in the right breast are suspicious. Stereotactic biopsy is recommended. The patient was notified of the results and recommendations prior to discharge. ACR  BI-RADS/FDA CODES:  4-Suspicious RECOMMENDATIONS:   1022 - Streotactic Guided Biopsy Electronically Signed By: Maritza Nascimento MD 12/18/2024 10:51 AM CST    Mammo Digital Screening Bilat w/ [employer]    Result Date: 12/14/2024  [address] [address]  [phone] 12/11/2024 1:28 PM CST [facility] MAMMO TOMOSYNTHESIS SCREENING BILATERAL HISTORY: Patient is 79 years Female and is seen for a screening mammogram.  Films Compared:  Prior available imaging was compared to the current study FINDINGS: Digital breast tomosynthesis (DBT) and computer-aided detection was utilized in the interpretation of this examination. The breasts are almost entirely fatty.   (a) next There are indeterminate calcifications in the upper inner right breast posterior depth. There is no evidence of suspicious masses, microcalcifications or architectural distortion in the left breast.     Indeterminate calcifications upper inner right breast posterior depth. Additional evaluation with diagnostic mammogram recommended. ACR BI-RADS/FDA CODES:  0-Need Additional Imaging Evaluation RECOMMENDATIONS:   1011 - Additional Imaging Your estimated lifetime risk of breast cancer (to age 85) based on Kuxnj-Ugrylz-4 risk assessment model is: 4.7%. According to the American Cancer Society, patients with a lifetime breast cancer risk of 20 % or higher might benefit from supplemental screening test, such as screening breast MRI.   Patient information entered into a reminder system with target date for the above recommendation. Electronically Signed By: Anju Barker MD 12/14/2024 2:52 PM CST      Assessment  1. Atherosclerotic peripheral vascular disease  Asymptomatic    2. Primary hypertension  Controlled    3. Hypercholesterolemia  Controlled.  To be reassessed soon.    4. Severe obesity  Encouraged to work on weight loss    5. Type 2 diabetes mellitus with diabetic peripheral angiopathy without gangrene,  without long-term current use of insulin  Due for follow-up labs soon.      Plan and Discussion    No change to present management    The 10-year ASCVD risk score (Enid DK, et al., 2019) is: 32.3%    Values used to calculate the score:      Age: 79 years      Sex: Female      Is Non- : Yes      Diabetic: Yes      Tobacco smoker: No      Systolic Blood Pressure: 136 mmHg      Is BP treated: Yes      HDL Cholesterol: 52 mg/dL      Total Cholesterol: 166 mg/dL     Follow Up  Follow up in about 1 year (around 12/30/2025).      Jh Mata MD

## 2025-01-27 ENCOUNTER — TELEPHONE (OUTPATIENT)
Dept: OBSTETRICS AND GYNECOLOGY | Facility: CLINIC | Age: 80
End: 2025-01-27
Payer: MEDICARE

## 2025-04-15 ENCOUNTER — OFFICE VISIT (OUTPATIENT)
Dept: URGENT CARE | Facility: CLINIC | Age: 80
End: 2025-04-15
Payer: MEDICARE

## 2025-04-15 VITALS
WEIGHT: 200 LBS | RESPIRATION RATE: 18 BRPM | TEMPERATURE: 97 F | DIASTOLIC BLOOD PRESSURE: 78 MMHG | HEIGHT: 64 IN | SYSTOLIC BLOOD PRESSURE: 160 MMHG | OXYGEN SATURATION: 97 % | HEART RATE: 74 BPM | BODY MASS INDEX: 34.15 KG/M2

## 2025-04-15 DIAGNOSIS — J98.8 BACTERIAL RESPIRATORY INFECTION: Primary | ICD-10-CM

## 2025-04-15 DIAGNOSIS — B96.89 BACTERIAL RESPIRATORY INFECTION: Primary | ICD-10-CM

## 2025-04-15 PROCEDURE — 99203 OFFICE O/P NEW LOW 30 MIN: CPT | Mod: S$GLB,,, | Performed by: NURSE PRACTITIONER

## 2025-04-15 RX ORDER — ALBUTEROL SULFATE 90 UG/1
2 INHALANT RESPIRATORY (INHALATION) EVERY 6 HOURS PRN
COMMUNITY
Start: 2025-04-03 | End: 2026-04-03

## 2025-04-15 RX ORDER — PROMETHAZINE HYDROCHLORIDE AND DEXTROMETHORPHAN HYDROBROMIDE 6.25; 15 MG/5ML; MG/5ML
5 SYRUP ORAL NIGHTLY PRN
Qty: 118 ML | Refills: 0 | Status: SHIPPED | OUTPATIENT
Start: 2025-04-15

## 2025-04-15 RX ORDER — AZELASTINE 1 MG/ML
1 SPRAY, METERED NASAL 2 TIMES DAILY
COMMUNITY
Start: 2025-03-21 | End: 2026-03-21

## 2025-04-15 RX ORDER — DOXYCYCLINE 100 MG/1
100 CAPSULE ORAL EVERY 12 HOURS
Qty: 14 CAPSULE | Refills: 0 | Status: SHIPPED | OUTPATIENT
Start: 2025-04-15 | End: 2025-04-22

## 2025-04-15 RX ORDER — AMLODIPINE BESYLATE 2.5 MG/1
2.5 TABLET ORAL DAILY
COMMUNITY

## 2025-04-15 RX ORDER — PREDNISONE 50 MG/1
50 TABLET ORAL DAILY
Qty: 5 TABLET | Refills: 0 | Status: SHIPPED | OUTPATIENT
Start: 2025-04-15 | End: 2025-04-20

## 2025-04-15 RX ORDER — BENZONATATE 200 MG/1
200 CAPSULE ORAL EVERY 8 HOURS PRN
Qty: 30 CAPSULE | Refills: 0 | Status: SHIPPED | OUTPATIENT
Start: 2025-04-15

## 2025-04-15 NOTE — PROGRESS NOTES
"Subjective:      Patient ID: Beatrice Shankar is a 80 y.o. female.    Vitals:  height is 5' 4" (1.626 m) and weight is 90.7 kg (200 lb). Her oral temperature is 97.1 °F (36.2 °C). Her blood pressure is 160/78 (abnormal) and her pulse is 74. Her respiration is 18 and oxygen saturation is 97%.     Chief Complaint: Cough    This is a 80 y.o. female who presents today with a chief complaint of cough, wheezing, SOB, sore throat, and headaches. Has h/o asthma and has been taking albuterol. Symptoms x2 weeks now. Also using astelin nasal spray and dimetapp cough med. BP noted as elevated. Pt says she hasn't taken her BP meds yet-she usually takes at night.     Cough  This is a new problem. The current episode started 1 to 4 weeks ago. The problem has been gradually worsening. The problem occurs every few minutes. The cough is Non-productive. Associated symptoms include headaches, nasal congestion, a sore throat, shortness of breath and wheezing. Pertinent negatives include no ear pain or fever. The symptoms are aggravated by lying down and exercise. She has tried steroid inhaler for the symptoms. The treatment provided mild relief. Her past medical history is significant for asthma and bronchitis. There is no history of pneumonia.       Constitution: Negative for fever.   HENT:  Positive for congestion and sore throat. Negative for ear pain.    Respiratory:  Positive for chest tightness, cough, shortness of breath, wheezing and asthma.    Gastrointestinal:  Negative for vomiting and diarrhea.   Allergic/Immunologic: Positive for asthma.   Neurological:  Positive for headaches.      Objective:     Physical Exam   Constitutional: She is oriented to person, place, and time. She appears well-developed. She is cooperative.  Non-toxic appearance. She does not appear ill. No distress.      Comments:Looks well. Good activity level. NAD. Normal speech.      HENT:   Head: Normocephalic.   Ears:   Right Ear: Hearing, tympanic " membrane, external ear and ear canal normal.   Left Ear: Hearing, tympanic membrane, external ear and ear canal normal.   Nose: Congestion present. No mucosal edema, rhinorrhea or nasal deformity. No epistaxis. Right sinus exhibits no maxillary sinus tenderness and no frontal sinus tenderness. Left sinus exhibits no maxillary sinus tenderness and no frontal sinus tenderness.   Mouth/Throat: Uvula is midline, oropharynx is clear and moist and mucous membranes are normal. Mucous membranes are moist. No trismus in the jaw. Normal dentition. No uvula swelling. No oropharyngeal exudate, posterior oropharyngeal edema or posterior oropharyngeal erythema. Oropharynx is clear.   Eyes: Conjunctivae and lids are normal. No scleral icterus.   Neck: Trachea normal and phonation normal. Neck supple. No edema present. No erythema present. No neck rigidity present.   Cardiovascular: Normal rate and regular rhythm.   Pulmonary/Chest: Effort normal. No respiratory distress. She has no decreased breath sounds. She has no wheezes. She has rhonchi.   Abdominal: Normal appearance.   Musculoskeletal: Normal range of motion.         General: No deformity. Normal range of motion.   Neurological: She is alert and oriented to person, place, and time. She exhibits normal muscle tone. Coordination normal.   Skin: Skin is warm, dry, intact, not diaphoretic and not pale.   Psychiatric: Her speech is normal and behavior is normal. Judgment and thought content normal.   Nursing note and vitals reviewed.      Assessment:     1. Bacterial respiratory infection        Plan:       Bacterial respiratory infection  -     doxycycline (VIBRAMYCIN) 100 MG Cap; Take 1 capsule (100 mg total) by mouth every 12 (twelve) hours. for 7 days  Dispense: 14 capsule; Refill: 0  -     benzonatate (TESSALON) 200 MG capsule; Take 1 capsule (200 mg total) by mouth every 8 (eight) hours as needed for Cough.  Dispense: 30 capsule; Refill: 0  -      promethazine-dextromethorphan (PROMETHAZINE-DM) 6.25-15 mg/5 mL Syrp; Take 5 mLs by mouth nightly as needed (cough).  Dispense: 118 mL; Refill: 0  -     predniSONE (DELTASONE) 50 MG Tab; Take 1 tablet (50 mg total) by mouth once daily. for 5 days  Dispense: 5 tablet; Refill: 0              Oral fluids  Hot tea with honey   Throat or cough lozenges  Ibuprofen or tylenol for any aches or fever   Rest  Steam from hot showers to help open upper airway  Blow nose often  Avoid circulating air (such as ceiling fans) dries your airway  Avoid drinking cold drinks (worsens cough)  Avoid strong smells which could worsen cough (perfume, lotions, smoke...)  You can also try a humidifier  Therapeutic coughing to expel mucous  Sit in upright position often  Follow up with any worsening symptoms; and seek ER care with any wheezing, retractions, or respiratory distress; lethargy; dehydration...

## 2025-06-07 ENCOUNTER — OFFICE VISIT (OUTPATIENT)
Dept: URGENT CARE | Facility: CLINIC | Age: 80
End: 2025-06-07
Payer: MEDICARE

## 2025-06-07 VITALS
TEMPERATURE: 98 F | WEIGHT: 196 LBS | HEIGHT: 64 IN | RESPIRATION RATE: 20 BRPM | SYSTOLIC BLOOD PRESSURE: 127 MMHG | HEART RATE: 65 BPM | BODY MASS INDEX: 33.46 KG/M2 | OXYGEN SATURATION: 99 % | DIASTOLIC BLOOD PRESSURE: 68 MMHG

## 2025-06-07 DIAGNOSIS — J20.9 ACUTE BRONCHITIS, UNSPECIFIED ORGANISM: Primary | ICD-10-CM

## 2025-06-07 LAB
CTP QC/QA: YES
SARS-COV+SARS-COV-2 AG RESP QL IA.RAPID: NEGATIVE

## 2025-06-07 PROCEDURE — 94640 AIRWAY INHALATION TREATMENT: CPT | Mod: S$GLB,,, | Performed by: FAMILY MEDICINE

## 2025-06-07 PROCEDURE — 96372 THER/PROPH/DIAG INJ SC/IM: CPT | Mod: 59,S$GLB,, | Performed by: FAMILY MEDICINE

## 2025-06-07 PROCEDURE — 87811 SARS-COV-2 COVID19 W/OPTIC: CPT | Mod: QW,S$GLB,, | Performed by: FAMILY MEDICINE

## 2025-06-07 PROCEDURE — 99214 OFFICE O/P EST MOD 30 MIN: CPT | Mod: 25,S$GLB,, | Performed by: FAMILY MEDICINE

## 2025-06-07 RX ORDER — ALBUTEROL SULFATE 0.83 MG/ML
2.5 SOLUTION RESPIRATORY (INHALATION)
Status: COMPLETED | OUTPATIENT
Start: 2025-06-07 | End: 2025-06-07

## 2025-06-07 RX ORDER — IPRATROPIUM BROMIDE 0.5 MG/2.5ML
0.5 SOLUTION RESPIRATORY (INHALATION)
Status: COMPLETED | OUTPATIENT
Start: 2025-06-07 | End: 2025-06-07

## 2025-06-07 RX ORDER — DEXAMETHASONE SODIUM PHOSPHATE 10 MG/ML
10 INJECTION INTRAMUSCULAR; INTRAVENOUS
Status: COMPLETED | OUTPATIENT
Start: 2025-06-07 | End: 2025-06-07

## 2025-06-07 RX ORDER — PROMETHAZINE HYDROCHLORIDE AND DEXTROMETHORPHAN HYDROBROMIDE 6.25; 15 MG/5ML; MG/5ML
5 SYRUP ORAL NIGHTLY PRN
Qty: 118 ML | Refills: 0 | Status: SHIPPED | OUTPATIENT
Start: 2025-06-07 | End: 2025-06-17

## 2025-06-07 RX ORDER — PREDNISONE 20 MG/1
40 TABLET ORAL DAILY
Qty: 10 TABLET | Refills: 0 | Status: SHIPPED | OUTPATIENT
Start: 2025-06-08 | End: 2025-06-13

## 2025-06-07 RX ORDER — ALBUTEROL SULFATE 90 UG/1
2 INHALANT RESPIRATORY (INHALATION) EVERY 6 HOURS PRN
Qty: 6.7 G | Refills: 0 | Status: SHIPPED | OUTPATIENT
Start: 2025-06-07

## 2025-06-07 RX ADMIN — DEXAMETHASONE SODIUM PHOSPHATE 10 MG: 10 INJECTION INTRAMUSCULAR; INTRAVENOUS at 01:06

## 2025-06-07 RX ADMIN — ALBUTEROL SULFATE 2.5 MG: 0.83 SOLUTION RESPIRATORY (INHALATION) at 12:06

## 2025-06-07 RX ADMIN — IPRATROPIUM BROMIDE 0.5 MG: 0.5 SOLUTION RESPIRATORY (INHALATION) at 12:06

## 2025-06-07 NOTE — PROGRESS NOTES
"Subjective:      Patient ID: Beatrice Shankar is a 80 y.o. female.    Vitals:  height is 5' 4" (1.626 m) and weight is 88.9 kg (196 lb). Her oral temperature is 97.6 °F (36.4 °C). Her blood pressure is 127/68 and her pulse is 65. Her respiration is 20 and oxygen saturation is 99%.     Chief Complaint: Cough    This is a 80 y.o. female who presents today with a chief complaint of   Cough, ear pain, and SOB, wheezing, clammy in the morning, and fatigue. Symptoms for about 2 weeks. Pt has a hx of Asthma. Using her inhaler and taking OTC couch sup.     Cough  This is a new problem. The current episode started 1 to 4 weeks ago. The problem has been gradually worsening. The problem occurs every few minutes. The cough is Non-productive. Associated symptoms include ear pain, myalgias, nasal congestion, rhinorrhea, shortness of breath, sweats and wheezing. Pertinent negatives include no chills, fever, headaches, heartburn, rash or sore throat. Associated symptoms comments: tightness. Exacerbated by: talking and after eating. She has tried steroid inhaler and OTC cough suppressant for the symptoms. The treatment provided mild relief. Her past medical history is significant for asthma. There is no history of bronchitis or pneumonia.       Constitution: Negative for chills and fever.   HENT:  Positive for ear pain. Negative for sore throat.    Respiratory:  Positive for cough, shortness of breath and wheezing.    Gastrointestinal:  Negative for heartburn.   Musculoskeletal:  Positive for muscle ache.   Skin:  Negative for rash.   Neurological:  Negative for headaches.      Objective:     Physical Exam   Constitutional: She does not appear ill. No distress. obesity  HENT:   Head: Normocephalic and atraumatic.   Nose: Congestion present.   Mouth/Throat: Mucous membranes are moist. Posterior oropharyngeal erythema present.   Neck: Neck supple.   Cardiovascular: Normal rate, regular rhythm, normal heart sounds and normal pulses. "   Pulmonary/Chest: Effort normal. No respiratory distress. She has wheezes. She has rhonchi.   Abdominal: Normal appearance. Soft.   Neurological: She is alert.   Nursing note and vitals reviewed.  Results for orders placed or performed in visit on 06/07/25   SARS Coronavirus 2 Antigen, POCT Manual Read    Collection Time: 06/07/25 12:23 PM   Result Value Ref Range    SARS Coronavirus 2 Antigen Negative Negative, Presumptive Negative     Acceptable Yes       Assessment:     1. Acute bronchitis, unspecified organism        Plan:       Acute bronchitis, unspecified organism  -     SARS Coronavirus 2 Antigen, POCT Manual Read  -     albuterol nebulizer solution 2.5 mg  -     ipratropium 0.02 % nebulizer solution 0.5 mg  -     dexAMETHasone injection 10 mg  -     predniSONE (DELTASONE) 20 MG tablet; Take 2 tablets (40 mg total) by mouth once daily. for 5 days  Dispense: 10 tablet; Refill: 0  -     albuterol (PROAIR HFA) 90 mcg/actuation inhaler; Inhale 2 puffs into the lungs every 6 (six) hours as needed for Wheezing. Rescue  Dispense: 6.7 g; Refill: 0  -     promethazine-dextromethorphan (PROMETHAZINE-DM) 6.25-15 mg/5 mL Syrp; Take 5 mLs by mouth nightly as needed (cough).  Dispense: 118 mL; Refill: 0